# Patient Record
Sex: MALE | Race: WHITE | NOT HISPANIC OR LATINO | URBAN - METROPOLITAN AREA
[De-identification: names, ages, dates, MRNs, and addresses within clinical notes are randomized per-mention and may not be internally consistent; named-entity substitution may affect disease eponyms.]

---

## 2023-06-23 ENCOUNTER — TELEPHONE (OUTPATIENT)
Dept: FAMILY MEDICINE CLINIC | Facility: CLINIC | Age: 58
End: 2023-06-23

## 2023-06-23 NOTE — TELEPHONE ENCOUNTER
Caller: Jaime Goff    Relationship: Self    Best call back number: [phone]     What is the best time to reach you: ASAP    Who are you requesting to speak with (clinical staff, provider,  specific staff member): BOO HERNANDEZ    What was the call regarding: PATIENT IS REQUESTING TO SPEAK WITH BOO HERNANDEZ REGARDING THE PAIN MEDICATION HE WAS PRESCRIBED TODAY, 06- AS HE HAS QUESTIONS REGARDING THIS MEDICATION.    PLEASE CALL ASAP TO DISCUSS

## 2023-08-21 ENCOUNTER — APPOINTMENT (OUTPATIENT)
Dept: GENERAL RADIOLOGY | Facility: HOSPITAL | Age: 58
End: 2023-08-21
Payer: MEDICAID

## 2023-08-21 ENCOUNTER — HOSPITAL ENCOUNTER (OUTPATIENT)
Facility: HOSPITAL | Age: 58
Discharge: HOME | End: 2023-08-21
Attending: EMERGENCY MEDICINE | Admitting: EMERGENCY MEDICINE
Payer: MEDICAID

## 2023-08-21 VITALS
OXYGEN SATURATION: 99 % | SYSTOLIC BLOOD PRESSURE: 142 MMHG | HEART RATE: 114 BPM | RESPIRATION RATE: 18 BRPM | TEMPERATURE: 98.4 F | HEIGHT: 72 IN | DIASTOLIC BLOOD PRESSURE: 102 MMHG | WEIGHT: 180 LBS | BODY MASS INDEX: 24.38 KG/M2

## 2023-08-21 DIAGNOSIS — S43.401A SPRAIN OF RIGHT SHOULDER, UNSPECIFIED SHOULDER SPRAIN TYPE, INITIAL ENCOUNTER: Primary | ICD-10-CM

## 2023-08-21 DIAGNOSIS — L03.113 CELLULITIS OF RIGHT UPPER EXTREMITY: ICD-10-CM

## 2023-08-21 PROCEDURE — G0463 HOSPITAL OUTPT CLINIC VISIT: HCPCS | Performed by: EMERGENCY MEDICINE

## 2023-08-21 PROCEDURE — 71046 X-RAY EXAM CHEST 2 VIEWS: CPT

## 2023-08-21 PROCEDURE — 73090 X-RAY EXAM OF FOREARM: CPT

## 2023-08-21 PROCEDURE — 73060 X-RAY EXAM OF HUMERUS: CPT

## 2023-08-21 PROCEDURE — 73030 X-RAY EXAM OF SHOULDER: CPT

## 2023-08-21 PROCEDURE — 63710000001 ONDANSETRON ODT 4 MG TABLET DISPERSIBLE: Performed by: EMERGENCY MEDICINE

## 2023-08-21 RX ORDER — HYDROCODONE BITARTRATE AND ACETAMINOPHEN 5; 325 MG/1; MG/1
1 TABLET ORAL EVERY 6 HOURS PRN
Qty: 10 TABLET | Refills: 0 | Status: SHIPPED | OUTPATIENT
Start: 2023-08-21 | End: 2023-08-28 | Stop reason: HOSPADM

## 2023-08-21 RX ORDER — CLINDAMYCIN HYDROCHLORIDE 300 MG/1
300 CAPSULE ORAL 3 TIMES DAILY
Qty: 30 CAPSULE | Refills: 0 | Status: SHIPPED | OUTPATIENT
Start: 2023-08-21 | End: 2023-08-28 | Stop reason: HOSPADM

## 2023-08-21 RX ORDER — HYDROCODONE BITARTRATE AND ACETAMINOPHEN 7.5; 325 MG/1; MG/1
1 TABLET ORAL ONCE
Status: COMPLETED | OUTPATIENT
Start: 2023-08-21 | End: 2023-08-21

## 2023-08-21 RX ORDER — ONDANSETRON 4 MG/1
4 TABLET, ORALLY DISINTEGRATING ORAL ONCE
Status: COMPLETED | OUTPATIENT
Start: 2023-08-21 | End: 2023-08-21

## 2023-08-21 RX ADMIN — HYDROCODONE BITARTRATE AND ACETAMINOPHEN 1 TABLET: 7.5; 325 TABLET ORAL at 18:57

## 2023-08-21 RX ADMIN — ONDANSETRON 4 MG: 4 TABLET, ORALLY DISINTEGRATING ORAL at 18:57

## 2023-08-21 NOTE — Clinical Note
[facility] [place]  [address]  [place] [address]    Jaime Goff was seen and treated in our emergency department on 8/21/2023.  He may return to work on 08/28/2023.         Thank you for choosing [facility].    Rah Torres MD

## 2023-08-21 NOTE — FSED PROVIDER NOTE
Subjective   History of Present Illness  Patient is a 58-year-old male.  He presents after a fall 2 days ago on Saturday.  He states he been on several steps.  He did strike his right shoulder and right upper extremity.  He may have had brief loss of conscious.  He is not anticoagulated.  He denies significant headache no nausea vomiting no confusion.  No midline neck or back pain.  No abdominal pain.  He primarily has significant pain at the right shoulder.  There is soft tissue swelling noted as well with some blisters overlying the right lateral shoulder.  He is up-to-date on immunization which was within the last year or 2.  He is right-hand dominant.  He reports decreased range of motion at the shoulder.  No focal motor or sensory deficits other than decreased flexion and abduction at the shoulder secondary to pain      Review of Systems  Constitutional: No fevers, chills, sweats unless otherwise documented in HPI  Eyes: No recent visual problems, eye discharge, eye pain, redness unless otherwise documented in HPI  HEENT: No ear pain, nasal congestion, sore throat, voice changes unless otherwise documented in HPI  Respiratory: No shortness of breath, cough, pain on breathing, sputum production unless otherwise documented in HPI  Cardiovascular: No chest pain, palpitations, syncope, orthopnea unless otherwise documented in HPI  Gastrointestinal: No nausea, vomiting, diarrhea, constipation unless otherwise documented in HPI  Genitourinary: No hematuria, dysuria, incontinence unless otherwise documented in HPI  Endocrine: Negative for excessive thirst, excessive hunger, excessive urination, heat or cold intolerance unless otherwise documented in HPI  Musculoskeletal: No back pain, neck pain, joint pain, muscle pain, decreased range of motion unless otherwise documented in HPI  Integumentary: No rash, pruritus, abrasion, lesions unless otherwise documented in HPI  Neurologic: No weakness, numbness, frequent  headaches, tremors unless otherwise documented in HPI  Psychiatric: No anxiety, depression, mood changes, hallucinations unless otherwise documented in HPI        History reviewed. No pertinent past medical history.    Allergies   Allergen Reactions    Penicillins Anaphylaxis       History reviewed. No pertinent surgical history.    History reviewed. No pertinent family history.    Social History     Socioeconomic History    Marital status: Single   Tobacco Use    Smoking status: Never     Passive exposure: Never    Smokeless tobacco: Never   Vaping Use    Vaping Use: Never used   Substance and Sexual Activity    Alcohol use: Never    Drug use: Never    Sexual activity: Defer           Objective   Physical Exam  Vital signs: [Reviewed in nurses notes]    General: Awake alert.  He does appear to feel poorly but is nontoxic    HEENT: Normocephalic atraumatic pupils equal round sponsored to light nasopharynx clear pharynx clear and moist    Neck:   Supple.  No midline cervical spine tenderness no step-offs    Respiratory:   Clear to auscultation bilaterally with equal breath sounds.    Chest: There is some tenderness in the right superior chest wall.  No crepitus    Cardiovascular: Regular rate and rhythm no murmurs.    Abdomen: Soft nondistended nontender x4 quadrants    Back: No midline thoracic or lumbosacral tenderness    Musculoskeletal: There is significant tenderness to palpation on the right lateral shoulder.  There is also soft tissue swelling noted.  There are some overlying blisters at the lateral shoulder.  The elbow itself is nontender the wrist is nontender radial ulnar pulses 2+ and equal distally.  Radial ulnar median nerves are intact to motor and sensory testing at the elbow and wrist.  Patient does have decreased abduction and shoulder flexion      Skin:   There are some blisters overlying the right lateral shoulder.  No secondary cellulitis    Neurological examination: Awake alert oriented x4.  GCS  15.  Normal motor and sensory testing x4 extremities except for decreased range of motion of the right shoulder secondary to pain.    Procedures           ED Course  ED Course as of 08/21/23 2137   Mon Aug 21, 2023   2133 I assumed care of this patient from Dr. Torres.  Patient presents with right arm pain after a fall.  X-rays show possible age-indeterminate enthesophyte fracture.  Patient has no tenderness in this area.  Patient does have an area of skin breakdown to the right shoulder with some warmth and erythema there.  Will prescribe antibiotics for possible cellulitis.  Patient is neurovascularly intact.  Strong distal pulse and normal sensation.  Compartments are soft.  No evidence of compartment syndrome at this time.  Offered Doppler to rule out DVT but patient declined.  Also offered CT of the head to rule out intracranial bleed but patient also declined.  We will discharge patient in sling and have him follow-up with orthopedics. [DH]      ED Course User Index  [DH] Sterling Price MD                                         Differential diagnoses includes fracture dislocation strain  Medical Decision Making  Amount and/or Complexity of Data Reviewed  Radiology: ordered.    Risk  Prescription drug management.    The x-rays were independently reviewed as well as review of the radiologist interpretation    Final diagnoses:   Sprain of right shoulder, unspecified shoulder sprain type, initial encounter   Cellulitis of right upper extremity       ED Disposition  ED Disposition       ED Disposition   Discharge    Condition   Stable    Comment   --               Tami Peralta, APRN  [address]  [facility] [id number] [phone]          James Nagel II, MD  [address]  [facility] [address]  [phone]    Schedule an appointment as soon as possible for a visit   Orthopedics         Medication List        New Prescriptions      clindamycin 300 MG capsule  Commonly known as:  CLEOCIN  Take 1 capsule by mouth 3 (Three) Times a Day for 10 days.     HYDROcodone-acetaminophen 5-325 MG per tablet  Commonly known as: NORCO  Take 1 tablet by mouth Every 6 (Six) Hours As Needed for Severe Pain.               Where to Get Your Medications        These medications were sent to [facility]/pharmacy #[id number] - [place], [place] - [address] [place] - [phone] [facility] [phone]   [address], [place] [address]      Hours: 24-hours Phone: [phone]   clindamycin 300 MG capsule  HYDROcodone-acetaminophen 5-325 MG per tablet

## 2023-08-22 NOTE — DISCHARGE INSTRUCTIONS
Keep the sling in place for comfort.  Use the antibiotics to treat the infection.  Use the Norco (hydrocodone) as needed for breakthrough pain but do not drive, operate heavy machinery, or perform other dangerous activities while using it.  Follow-up with orthopedics as soon as possible.

## 2023-08-26 ENCOUNTER — APPOINTMENT (OUTPATIENT)
Dept: ULTRASOUND IMAGING | Facility: HOSPITAL | Age: 58
DRG: 556 | End: 2023-08-26
Payer: MEDICAID

## 2023-08-26 ENCOUNTER — HOSPITAL ENCOUNTER (INPATIENT)
Facility: HOSPITAL | Age: 58
LOS: 1 days | Discharge: HOME | DRG: 556 | End: 2023-08-28
Attending: EMERGENCY MEDICINE | Admitting: HOSPITALIST
Payer: MEDICAID

## 2023-08-26 ENCOUNTER — APPOINTMENT (OUTPATIENT)
Dept: CT IMAGING | Facility: HOSPITAL | Age: 58
DRG: 556 | End: 2023-08-26
Payer: MEDICAID

## 2023-08-26 DIAGNOSIS — M60.9: Primary | ICD-10-CM

## 2023-08-26 LAB
ALBUMIN SERPL-MCNC: 3.4 G/DL (ref 3.5–5.2)
ALBUMIN/GLOB SERPL: 1.5 G/DL
ALP SERPL-CCNC: 57 U/L (ref 39–117)
ALT SERPL W P-5'-P-CCNC: 91 U/L (ref 1–41)
ANION GAP SERPL CALCULATED.3IONS-SCNC: 1.9 MMOL/L (ref 5–15)
AST SERPL-CCNC: 76 U/L (ref 1–40)
BASOPHILS # BLD AUTO: 0.04 10*3/MM3 (ref 0–0.2)
BASOPHILS NFR BLD AUTO: 0.5 % (ref 0–1.5)
BILIRUB SERPL-MCNC: 0.3 MG/DL (ref 0–1.2)
BUN SERPL-MCNC: 21 MG/DL (ref 6–20)
BUN/CREAT SERPL: 17.1 (ref 7–25)
CALCIUM SPEC-SCNC: 8.5 MG/DL (ref 8.6–10.5)
CHLORIDE SERPL-SCNC: 100 MMOL/L (ref 98–107)
CO2 SERPL-SCNC: 27.1 MMOL/L (ref 22–29)
CREAT SERPL-MCNC: 1.23 MG/DL (ref 0.76–1.27)
D-LACTATE SERPL-SCNC: 0.7 MMOL/L (ref 0.5–2)
DEPRECATED RDW RBC AUTO: 42.1 FL (ref 37–54)
EGFRCR SERPLBLD CKD-EPI 2021: 68.9 ML/MIN/1.73
EOSINOPHIL # BLD AUTO: 0.33 10*3/MM3 (ref 0–0.4)
EOSINOPHIL NFR BLD AUTO: 3.8 % (ref 0.3–6.2)
ERYTHROCYTE [DISTWIDTH] IN BLOOD BY AUTOMATED COUNT: 12.1 % (ref 12.3–15.4)
GLOBULIN UR ELPH-MCNC: 2.2 GM/DL
GLUCOSE SERPL-MCNC: 88 MG/DL (ref 65–99)
HCT VFR BLD AUTO: 37.2 % (ref 37.5–51)
HGB BLD-MCNC: 12.7 G/DL (ref 13–17.7)
IMM GRANULOCYTES # BLD AUTO: 0.1 10*3/MM3 (ref 0–0.05)
IMM GRANULOCYTES NFR BLD AUTO: 1.1 % (ref 0–0.5)
INR PPP: 0.9
LYMPHOCYTES # BLD AUTO: 1.63 10*3/MM3 (ref 0.7–3.1)
LYMPHOCYTES NFR BLD AUTO: 18.6 % (ref 19.6–45.3)
MCH RBC QN AUTO: 31.7 PG (ref 26.6–33)
MCHC RBC AUTO-ENTMCNC: 34.1 G/DL (ref 31.5–35.7)
MCV RBC AUTO: 92.8 FL (ref 79–97)
MONOCYTES # BLD AUTO: 0.78 10*3/MM3 (ref 0.1–0.9)
MONOCYTES NFR BLD AUTO: 8.9 % (ref 5–12)
NEUTROPHILS NFR BLD AUTO: 5.89 10*3/MM3 (ref 1.7–7)
NEUTROPHILS NFR BLD AUTO: 67.1 % (ref 42.7–76)
PLATELET # BLD AUTO: 225 10*3/MM3 (ref 140–450)
PMV BLD AUTO: 9.3 FL (ref 6–12)
POTASSIUM SERPL-SCNC: 4.2 MMOL/L (ref 3.5–5.2)
PROT SERPL-MCNC: 5.6 G/DL (ref 6–8.5)
PROTHROMBIN TIME: 10.9 SECONDS (ref 11–15)
RBC # BLD AUTO: 4.01 10*6/MM3 (ref 4.14–5.8)
SODIUM SERPL-SCNC: 129 MMOL/L (ref 136–145)
WBC NRBC COR # BLD: 8.77 10*3/MM3 (ref 3.4–10.8)

## 2023-08-26 PROCEDURE — 99285 EMERGENCY DEPT VISIT HI MDM: CPT

## 2023-08-26 PROCEDURE — 25510000001 IOPAMIDOL PER 1 ML: Performed by: EMERGENCY MEDICINE

## 2023-08-26 PROCEDURE — 36415 COLL VENOUS BLD VENIPUNCTURE: CPT

## 2023-08-26 PROCEDURE — 85025 COMPLETE CBC W/AUTO DIFF WBC: CPT | Performed by: PHYSICIAN ASSISTANT

## 2023-08-26 PROCEDURE — 80053 COMPREHEN METABOLIC PANEL: CPT | Performed by: PHYSICIAN ASSISTANT

## 2023-08-26 PROCEDURE — 86140 C-REACTIVE PROTEIN: CPT | Performed by: HOSPITALIST

## 2023-08-26 PROCEDURE — 83605 ASSAY OF LACTIC ACID: CPT | Performed by: EMERGENCY MEDICINE

## 2023-08-26 PROCEDURE — 87040 BLOOD CULTURE FOR BACTERIA: CPT | Performed by: EMERGENCY MEDICINE

## 2023-08-26 PROCEDURE — 84145 PROCALCITONIN (PCT): CPT | Performed by: HOSPITALIST

## 2023-08-26 PROCEDURE — 73201 CT UPPER EXTREMITY W/DYE: CPT

## 2023-08-26 PROCEDURE — 93971 EXTREMITY STUDY: CPT

## 2023-08-26 PROCEDURE — 83874 ASSAY OF MYOGLOBIN: CPT | Performed by: EMERGENCY MEDICINE

## 2023-08-26 PROCEDURE — 82550 ASSAY OF CK (CPK): CPT | Performed by: EMERGENCY MEDICINE

## 2023-08-26 PROCEDURE — 85610 PROTHROMBIN TIME: CPT

## 2023-08-26 RX ORDER — MORPHINE SULFATE 4 MG/ML
4 INJECTION, SOLUTION INTRAMUSCULAR; INTRAVENOUS ONCE
Status: COMPLETED | OUTPATIENT
Start: 2023-08-27 | End: 2023-08-27

## 2023-08-26 RX ORDER — CLINDAMYCIN PHOSPHATE 900 MG/50ML
900 INJECTION, SOLUTION INTRAVENOUS ONCE
Status: COMPLETED | OUTPATIENT
Start: 2023-08-27 | End: 2023-08-27

## 2023-08-26 RX ORDER — LEVOFLOXACIN 5 MG/ML
750 INJECTION, SOLUTION INTRAVENOUS ONCE
Status: COMPLETED | OUTPATIENT
Start: 2023-08-27 | End: 2023-08-27

## 2023-08-26 RX ADMIN — IOPAMIDOL 85 ML: 755 INJECTION, SOLUTION INTRAVENOUS at 21:22

## 2023-08-27 ENCOUNTER — APPOINTMENT (OUTPATIENT)
Dept: MRI IMAGING | Facility: HOSPITAL | Age: 58
DRG: 556 | End: 2023-08-27
Payer: MEDICAID

## 2023-08-27 PROBLEM — M60.9: Status: ACTIVE | Noted: 2023-08-27

## 2023-08-27 LAB
AMPHET+METHAMPHET UR QL: NEGATIVE
AMPHETAMINES UR QL: NEGATIVE
BARBITURATES UR QL SCN: POSITIVE
BENZODIAZ UR QL SCN: NEGATIVE
BILIRUB UR QL STRIP: NEGATIVE
BUPRENORPHINE SERPL-MCNC: NEGATIVE NG/ML
CANNABINOIDS SERPL QL: POSITIVE
CK SERPL-CCNC: 1177 U/L (ref 20–200)
CK SERPL-CCNC: 644 U/L (ref 20–200)
CLARITY UR: CLEAR
COCAINE UR QL: NEGATIVE
COLOR UR: YELLOW
CRP SERPL-MCNC: 0.39 MG/DL (ref 0–0.5)
GLUCOSE UR STRIP-MCNC: NEGATIVE MG/DL
HGB UR QL STRIP.AUTO: ABNORMAL
KETONES UR QL STRIP: NEGATIVE
LEUKOCYTE ESTERASE UR QL STRIP.AUTO: NEGATIVE
METHADONE UR QL SCN: NEGATIVE
MYOGLOBIN SERPL-MCNC: 39.2 NG/ML (ref 28–72)
NITRITE UR QL STRIP: NEGATIVE
OPIATES UR QL: NEGATIVE
OXYCODONE UR QL SCN: NEGATIVE
PCP UR QL SCN: NEGATIVE
PH UR STRIP.AUTO: 6 [PH] (ref 5–8)
PROCALCITONIN SERPL-MCNC: 0.11 NG/ML (ref 0–0.25)
PROPOXYPH UR QL: NEGATIVE
PROT UR QL STRIP: NEGATIVE
SP GR UR STRIP: 1.01 (ref 1–1.03)
TRICYCLICS UR QL SCN: NEGATIVE
UROBILINOGEN UR QL STRIP: ABNORMAL

## 2023-08-27 PROCEDURE — 99222 1ST HOSP IP/OBS MODERATE 55: CPT | Performed by: INTERNAL MEDICINE

## 2023-08-27 PROCEDURE — 81003 URINALYSIS AUTO W/O SCOPE: CPT | Performed by: EMERGENCY MEDICINE

## 2023-08-27 PROCEDURE — 25010000002 CLINDAMYCIN PER 300 MG: Performed by: EMERGENCY MEDICINE

## 2023-08-27 PROCEDURE — 25010000002 CLINDAMYCIN 900 MG/6ML SOLUTION: Performed by: NURSE PRACTITIONER

## 2023-08-27 PROCEDURE — 25010000002 VANCOMYCIN 750 MG RECONSTITUTED SOLUTION 1 EACH VIAL: Performed by: NURSE PRACTITIONER

## 2023-08-27 PROCEDURE — 25010000002 LEVOFLOXACIN PER 250 MG: Performed by: EMERGENCY MEDICINE

## 2023-08-27 PROCEDURE — 80306 DRUG TEST PRSMV INSTRMNT: CPT | Performed by: EMERGENCY MEDICINE

## 2023-08-27 PROCEDURE — 82550 ASSAY OF CK (CPK): CPT | Performed by: HOSPITALIST

## 2023-08-27 PROCEDURE — 25010000002 MORPHINE PER 10 MG: Performed by: NURSE PRACTITIONER

## 2023-08-27 PROCEDURE — 73223 MRI JOINT UPR EXTR W/O&W/DYE: CPT

## 2023-08-27 PROCEDURE — 0 GADOBENATE DIMEGLUMINE 529 MG/ML SOLUTION: Performed by: ORTHOPAEDIC SURGERY

## 2023-08-27 PROCEDURE — 25010000002 VANCOMYCIN 10 G RECONSTITUTED SOLUTION: Performed by: EMERGENCY MEDICINE

## 2023-08-27 PROCEDURE — 25010000002 MORPHINE PER 10 MG: Performed by: EMERGENCY MEDICINE

## 2023-08-27 PROCEDURE — A9577 INJ MULTIHANCE: HCPCS | Performed by: ORTHOPAEDIC SURGERY

## 2023-08-27 RX ORDER — CLINDAMYCIN PHOSPHATE 600 MG/50ML
600 INJECTION, SOLUTION INTRAVENOUS EVERY 8 HOURS
Status: DISCONTINUED | OUTPATIENT
Start: 2023-08-27 | End: 2023-08-27

## 2023-08-27 RX ORDER — SODIUM CHLORIDE 0.9 % (FLUSH) 0.9 %
10 SYRINGE (ML) INJECTION EVERY 12 HOURS SCHEDULED
Status: DISCONTINUED | OUTPATIENT
Start: 2023-08-27 | End: 2023-08-28 | Stop reason: HOSPADM

## 2023-08-27 RX ORDER — ACETAMINOPHEN 160 MG/5ML
650 SOLUTION ORAL EVERY 4 HOURS PRN
Status: DISCONTINUED | OUTPATIENT
Start: 2023-08-27 | End: 2023-08-28 | Stop reason: HOSPADM

## 2023-08-27 RX ORDER — ACETAMINOPHEN 325 MG/1
650 TABLET ORAL EVERY 4 HOURS PRN
Status: DISCONTINUED | OUTPATIENT
Start: 2023-08-27 | End: 2023-08-28 | Stop reason: HOSPADM

## 2023-08-27 RX ORDER — ONDANSETRON 4 MG/1
4 TABLET, FILM COATED ORAL EVERY 6 HOURS PRN
Status: DISCONTINUED | OUTPATIENT
Start: 2023-08-27 | End: 2023-08-28 | Stop reason: HOSPADM

## 2023-08-27 RX ORDER — POLYETHYLENE GLYCOL 3350 17 G/17G
17 POWDER, FOR SOLUTION ORAL DAILY PRN
Status: DISCONTINUED | OUTPATIENT
Start: 2023-08-27 | End: 2023-08-28 | Stop reason: HOSPADM

## 2023-08-27 RX ORDER — BISACODYL 10 MG
10 SUPPOSITORY, RECTAL RECTAL DAILY PRN
Status: DISCONTINUED | OUTPATIENT
Start: 2023-08-27 | End: 2023-08-28 | Stop reason: HOSPADM

## 2023-08-27 RX ORDER — SODIUM CHLORIDE 9 MG/ML
40 INJECTION, SOLUTION INTRAVENOUS AS NEEDED
Status: DISCONTINUED | OUTPATIENT
Start: 2023-08-27 | End: 2023-08-28 | Stop reason: HOSPADM

## 2023-08-27 RX ORDER — AMOXICILLIN 250 MG
2 CAPSULE ORAL 2 TIMES DAILY
Status: DISCONTINUED | OUTPATIENT
Start: 2023-08-27 | End: 2023-08-28 | Stop reason: HOSPADM

## 2023-08-27 RX ORDER — ONDANSETRON 2 MG/ML
4 INJECTION INTRAMUSCULAR; INTRAVENOUS EVERY 6 HOURS PRN
Status: DISCONTINUED | OUTPATIENT
Start: 2023-08-27 | End: 2023-08-28 | Stop reason: HOSPADM

## 2023-08-27 RX ORDER — MORPHINE SULFATE 2 MG/ML
1 INJECTION, SOLUTION INTRAMUSCULAR; INTRAVENOUS EVERY 4 HOURS PRN
Status: DISCONTINUED | OUTPATIENT
Start: 2023-08-27 | End: 2023-08-28 | Stop reason: HOSPADM

## 2023-08-27 RX ORDER — HYDROCODONE BITARTRATE AND ACETAMINOPHEN 7.5; 325 MG/1; MG/1
1 TABLET ORAL EVERY 4 HOURS PRN
Status: DISCONTINUED | OUTPATIENT
Start: 2023-08-27 | End: 2023-08-28 | Stop reason: HOSPADM

## 2023-08-27 RX ORDER — SODIUM CHLORIDE 0.9 % (FLUSH) 0.9 %
10 SYRINGE (ML) INJECTION AS NEEDED
Status: DISCONTINUED | OUTPATIENT
Start: 2023-08-27 | End: 2023-08-28 | Stop reason: HOSPADM

## 2023-08-27 RX ORDER — CALCIUM CARBONATE 500 MG/1
2 TABLET, CHEWABLE ORAL 2 TIMES DAILY PRN
Status: DISCONTINUED | OUTPATIENT
Start: 2023-08-27 | End: 2023-08-28 | Stop reason: HOSPADM

## 2023-08-27 RX ORDER — MORPHINE SULFATE 4 MG/ML
4 INJECTION, SOLUTION INTRAMUSCULAR; INTRAVENOUS ONCE
Status: COMPLETED | OUTPATIENT
Start: 2023-08-27 | End: 2023-08-27

## 2023-08-27 RX ORDER — ACETAMINOPHEN 650 MG/1
650 SUPPOSITORY RECTAL EVERY 4 HOURS PRN
Status: DISCONTINUED | OUTPATIENT
Start: 2023-08-27 | End: 2023-08-28 | Stop reason: HOSPADM

## 2023-08-27 RX ORDER — SODIUM CHLORIDE 9 MG/ML
75 INJECTION, SOLUTION INTRAVENOUS CONTINUOUS
Status: DISCONTINUED | OUTPATIENT
Start: 2023-08-27 | End: 2023-08-28 | Stop reason: HOSPADM

## 2023-08-27 RX ORDER — BISACODYL 5 MG/1
5 TABLET, DELAYED RELEASE ORAL DAILY PRN
Status: DISCONTINUED | OUTPATIENT
Start: 2023-08-27 | End: 2023-08-28 | Stop reason: HOSPADM

## 2023-08-27 RX ADMIN — VANCOMYCIN HYDROCHLORIDE 1750 MG: 10 INJECTION, POWDER, LYOPHILIZED, FOR SOLUTION INTRAVENOUS at 01:24

## 2023-08-27 RX ADMIN — SODIUM CHLORIDE 900 MG: 9 INJECTION, SOLUTION INTRAVENOUS at 00:05

## 2023-08-27 RX ADMIN — VANCOMYCIN HYDROCHLORIDE 750 MG: 750 INJECTION, POWDER, LYOPHILIZED, FOR SOLUTION INTRAVENOUS at 16:04

## 2023-08-27 RX ADMIN — HYDROCODONE BITARTRATE AND ACETAMINOPHEN 1 TABLET: 7.5; 325 TABLET ORAL at 09:06

## 2023-08-27 RX ADMIN — MORPHINE SULFATE 4 MG: 4 INJECTION, SOLUTION INTRAMUSCULAR; INTRAVENOUS at 00:04

## 2023-08-27 RX ADMIN — Medication 10 ML: at 09:06

## 2023-08-27 RX ADMIN — SENNOSIDES AND DOCUSATE SODIUM 2 TABLET: 50; 8.6 TABLET ORAL at 20:25

## 2023-08-27 RX ADMIN — SODIUM CHLORIDE 75 ML/HR: 9 INJECTION, SOLUTION INTRAVENOUS at 23:24

## 2023-08-27 RX ADMIN — LEVOFLOXACIN 750 MG: 5 INJECTION, SOLUTION INTRAVENOUS at 03:39

## 2023-08-27 RX ADMIN — MORPHINE SULFATE 1 MG: 2 INJECTION, SOLUTION INTRAMUSCULAR; INTRAVENOUS at 06:37

## 2023-08-27 RX ADMIN — MORPHINE SULFATE 4 MG: 4 INJECTION, SOLUTION INTRAMUSCULAR; INTRAVENOUS at 01:53

## 2023-08-27 RX ADMIN — GADOBENATE DIMEGLUMINE 16 ML: 529 INJECTION, SOLUTION INTRAVENOUS at 14:22

## 2023-08-27 RX ADMIN — SODIUM CHLORIDE 75 ML/HR: 9 INJECTION, SOLUTION INTRAVENOUS at 06:38

## 2023-08-27 RX ADMIN — MORPHINE SULFATE 1 MG: 2 INJECTION, SOLUTION INTRAMUSCULAR; INTRAVENOUS at 20:25

## 2023-08-27 RX ADMIN — SODIUM CHLORIDE 600 MG: 9 INJECTION, SOLUTION INTRAVENOUS at 09:06

## 2023-08-27 RX ADMIN — MORPHINE SULFATE 1 MG: 2 INJECTION, SOLUTION INTRAMUSCULAR; INTRAVENOUS at 11:57

## 2023-08-27 RX ADMIN — MORPHINE SULFATE 1 MG: 2 INJECTION, SOLUTION INTRAMUSCULAR; INTRAVENOUS at 16:06

## 2023-08-27 NOTE — CONSULTS
"Referring Provider: Dr Morales    Reason for Consultation: \"right arm swelling\"    History of present illness:  Jaime Goff is a 58 y.o. who I am asked to evaluate and give opinion for \"right arm swelling\". History is obtained from the patient and review of the old medical records which I summarize/synthesize as follows: About a week ago he had a fall down the stairs at his home and landed on the R shoulder. He experienced sharp pain and swelling. He had a skin abrasion. He was seen in the ER here and had x-rays negative for fracture. Due to concern for superimposed cellulitis, he was RXed clindamycin.     He returned to the ER yesterday with persistent pain and ongoing swelling. No fever or chills. He was found to have a normal WBC and fairly low CRP of 0.39 mg/dL. His CK was quite elevated at 1,177. He had a CT done (see below). ID and orthopedics consulted. He was started on IV vancomycin and clindamycin. He was also give a 1x dose of levofloxacin.     He denies a prior history of any serious or unusual infections. No history of MRSA.     PMH/PSH:  Denies any current medical problems    Social History:  Lives in the [place]  Has a girlfriend    Antibiotic allergies and intolerances:    Penicillin - \"whole body swelling\"    Medications:    Current Facility-Administered Medications:     acetaminophen (TYLENOL) tablet 650 mg, 650 mg, Oral, Q4H PRN **OR** acetaminophen (TYLENOL) 160 MG/5ML solution 650 mg, 650 mg, Oral, Q4H PRN **OR** acetaminophen (TYLENOL) suppository 650 mg, 650 mg, Rectal, Q4H PRN, Edwige Hunter APRN    sennosides-docusate (PERICOLACE) 8.6-50 MG per tablet 2 tablet, 2 tablet, Oral, BID **AND** polyethylene glycol (MIRALAX) packet 17 g, 17 g, Oral, Daily PRN **AND** bisacodyl (DULCOLAX) EC tablet 5 mg, 5 mg, Oral, Daily PRN **AND** bisacodyl (DULCOLAX) suppository 10 mg, 10 mg, Rectal, Daily PRN, Edwige Hunter APRN    calcium carbonate (TUMS) chewable tablet 500 mg (200 mg " elemental), 2 tablet, Oral, BID PRN, Edwige Hunter APRN    clindamycin (CLEOCIN) 600 mg in sodium chloride 0.9% 50 mL IVPB (premix), 600 mg, Intravenous, Q8H, Edwige Hunter APRN, Last Rate: 50 mL/hr at 08/27/23 0906, 600 mg at 08/27/23 0906    HYDROcodone-acetaminophen (NORCO) 7.5-325 MG per tablet 1 tablet, 1 tablet, Oral, Q4H PRN, Edwige Hunter APRN, 1 tablet at 08/27/23 0906    morphine injection 1 mg, 1 mg, Intravenous, Q4H PRN, Edwige Hunter APRN, 1 mg at 08/27/23 0637    ondansetron (ZOFRAN) tablet 4 mg, 4 mg, Oral, Q6H PRN **OR** ondansetron (ZOFRAN) injection 4 mg, 4 mg, Intravenous, Q6H PRN, Edwige Hunter APRN    Pharmacy Consult - Pharmacy to dose, , Does not apply, Continuous PRN, Edwige Hunter APRN    Pharmacy to dose vancomycin, , Does not apply, Continuous PRN, Edwige Hunter APRN    sodium chloride 0.9 % flush 10 mL, 10 mL, Intravenous, Q12H, Edwige Hunter APRN, 10 mL at 08/27/23 0906    sodium chloride 0.9 % flush 10 mL, 10 mL, Intravenous, PRN, Edwige Hunter APRN    sodium chloride 0.9 % infusion 40 mL, 40 mL, Intravenous, PRN, Edwige Hunter APRN    sodium chloride 0.9 % infusion, 75 mL/hr, Intravenous, Continuous, Edwige Hunter APRN, Last Rate: 75 mL/hr at 08/27/23 0638, 75 mL/hr at 08/27/23 0638      Objective   Vital Signs   Temp:  [97 °F (36.1 °C)-98.4 °F (36.9 °C)] 97 °F (36.1 °C)  Heart Rate:  [66-88] 66  Resp:  [14-16] 16  BP: (135-144)/(78-90) 135/79    Physical Exam:   General: awake, alert, NAD   Eyes: no scleral icterus  ENT: no thrush  Cardiovascular: NR  Respiratory: normal work of breathing on ambient air  GI: Abdomen is soft, not tender, + bowel sounds in all four quadrants  :  no Hi catheter  MSK: R shoulder abrasion; mildly warm but no erythema or drainage; R arm edematous; good movement of fingers  Skin: No rashes  Neurological: Alert and oriented x 3  Psychiatric: Normal mood and affect  "  Vasc: PIV w/o erythema; R radial pulse is strong    Labs:     Lab Results   Component Value Date    WBC 8.77 08/26/2023    HGB 12.7 (L) 08/26/2023    HCT 37.2 (L) 08/26/2023    MCV 92.8 08/26/2023     08/26/2023       Lab Results   Component Value Date    GLUCOSE 88 08/26/2023    BUN 21 (H) 08/26/2023    CREATININE 1.23 08/26/2023    BCR 17.1 08/26/2023    CO2 27.1 08/26/2023    CALCIUM 8.5 (L) 08/26/2023    ALBUMIN 3.4 (L) 08/26/2023    AST 76 (H) 08/26/2023    ALT 91 (H) 08/26/2023     Lab Results   Component Value Date    CRP 0.39 08/26/2023     Lab Results   Component Value Date    CKTOTAL 644 (H) 08/27/2023     Procal 0.11  LA 0.7  UDS + THC and barbiturates    Microbiology:  8/26 BCx: pending    Radiology:  CT RUE:   \"1.  Multifocal areas of geographic absent intramuscular enhancement   around the shoulder most pronounced within the deltoid but also involving   the proximal triceps, latissimus, and pectoralis major.  Muscular infarct,    myositis, and posttraumatic changes are all on the differential.    Consider MRI of the shoulder with and without contrast.   2.  Extensive subcutaneous soft tissue edema throughout the right upper   extremity.  No visualized abscess or soft tissue gas.     ASSESSMENT/PLAN:  Right arm pain and swelling  Elevated CPK  S/P fall down stairs at home    I think his swelling and pain are more likely due injury from the fall rather than infection. He his afebrile with a normal WBC and low CRP. He has an abrasion but it does not appear infected. Orthopedics evaluation is pending. I will follow-up their note. I'll keep him on the vancomycin while awaiting blood cultures, clinical course, and orthopedics evaluation but if no definitive evidence of infection by tomorrow then I will plan to stop it. Stop clindamycin. He was on it at home w/o any appreciable change.     While the patient is on vancomycin, vancomycin levels will be ordered and reviewed with dose adjustments made as " needed. The patient will have a daily creatinine level checked while on vancomycin as part of monitoring this medication.     ID will follow. Will d/w Dr Morales.

## 2023-08-27 NOTE — ED NOTES
"Nursing report ED to floor  Jaime Goff  58 y.o.  male    HPI :   Chief Complaint   Patient presents with    Arm Swelling     right       Admitting doctor:   Antonio Horta MD    Admitting diagnosis:   The encounter diagnosis was Myositis of right upper extremity, unspecified myositis type.    Code status:   Current Code Status       Date Active Code Status Order ID Comments User Context       Not on file            Allergies:   Penicillins    Isolation:   No active isolations    Intake and Output    Intake/Output Summary (Last 24 hours) at 8/27/2023 0442  Last data filed at 8/27/2023 0333  Gross per 24 hour   Intake 500 ml   Output --   Net 500 ml       Weight:       08/26/23  1809   Weight: 81.6 kg (180 lb)       Most recent vitals:   Vitals:    08/26/23 1809 08/26/23 2100 08/27/23 0044 08/27/23 0429   BP: 135/90 144/82 137/85 143/78   BP Location:  Left arm  Left arm   Patient Position: Sitting Lying  Lying   Pulse: 81 66 66 88   Resp: 16 14 16 14   Temp: 98.3 °F (36.8 °C)  98 °F (36.7 °C) 98.4 °F (36.9 °C)   TempSrc:   Oral Oral   SpO2: 98% 100% 100% 98%   Weight: 81.6 kg (180 lb)      Height: 182.9 cm (72\")          Active LDAs/IV Access:   Lines, Drains & Airways       Active LDAs       Name Placement date Placement time Site Days    Peripheral IV 08/26/23 1856 Left Antecubital 08/26/23 1856  Antecubital  less than 1                    Labs (abnormal labs have a star):   Labs Reviewed   COMPREHENSIVE METABOLIC PANEL - Abnormal; Notable for the following components:       Result Value    BUN 21 (*)     Sodium 129 (*)     Calcium 8.5 (*)     Total Protein 5.6 (*)     Albumin 3.4 (*)     ALT (SGPT) 91 (*)     AST (SGOT) 76 (*)     Anion Gap 1.9 (*)     All other components within normal limits    Narrative:     GFR Normal >60  Chronic Kidney Disease <60  Kidney Failure <15     CBC WITH AUTO DIFFERENTIAL - Abnormal; Notable for the following components:    RBC 4.01 (*)     Hemoglobin 12.7 (*)     Hematocrit " 37.2 (*)     RDW 12.1 (*)     Lymphocyte % 18.6 (*)     Immature Grans % 1.1 (*)     Immature Grans, Absolute 0.10 (*)     All other components within normal limits   LAB PROTIME-INR, FINGERSTICK - Abnormal; Notable for the following components:    Protime 10.9 (*)     All other components within normal limits   CK - Abnormal; Notable for the following components:    Creatine Kinase 1,177 (*)     All other components within normal limits   URINE DRUG SCREEN - Abnormal; Notable for the following components:    THC, Screen, Urine Positive (*)     Barbiturates Screen, Urine Positive (*)     All other components within normal limits    Narrative:     Cutoff For Drugs Screened:    Amphetamines               500 ng/ml  Barbiturates               200 ng/ml  Benzodiazepines            150 ng/ml  Cocaine                    150 ng/ml  Methadone                  200 ng/ml  Opiates                    100 ng/ml  Phencyclidine               25 ng/ml  THC                            50 ng/ml  Methamphetamine            500 ng/ml  Tricyclic Antidepressants  300 ng/ml  Oxycodone                  100 ng/ml  Propoxyphene               300 ng/ml  Buprenorphine               10 ng/ml    The normal value for all drugs tested is negative. This report includes unconfirmed screening results, with the cutoff values listed, to be used for medical treatment purposes only.  Unconfirmed results must not be used for non-medical purposes such as employment or legal testing.  Clinical consideration should be applied to any drug of abuse test, particularly when unconfirmed results are used.     URINALYSIS W/ MICROSCOPIC IF INDICATED (NO CULTURE) - Abnormal; Notable for the following components:    Blood, UA Trace (*)     All other components within normal limits   LACTIC ACID, PLASMA - Normal   BLOOD CULTURE   BLOOD CULTURE   MYOGLOBIN, SERUM   URINALYSIS, MICROSCOPIC ONLY   POCT PROTIME - INR   CBC AND DIFFERENTIAL    Narrative:     The following  orders were created for panel order CBC & Differential.  Procedure                               Abnormality         Status                     ---------                               -----------         ------                     CBC Auto Differential[[id number]]        Abnormal            Final result                 Please view results for these tests on the individual orders.       EKG:   No orders to display       Meds given in ED:   Medications   levoFLOXacin (LEVAQUIN) 750 mg/150 mL D5W (premix) (LEVAQUIN) 750 mg (750 mg Intravenous New Bag 8/27/23 0339)   iopamidol (ISOVUE-370) 76 % injection 100 mL (85 mL Intravenous Given 8/26/23 2122)   Morphine sulfate (PF) injection 4 mg (4 mg Intravenous Given 8/27/23 0004)   vancomycin 1750 mg/500 mL 0.9% NS IVPB ([facility]) (0 mg Intravenous Stopped 8/27/23 0333)   clindamycin (CLEOCIN) 900 mg in sodium chloride 0.9% 50 mL IVPB (premix) (0 mg Intravenous Stopped 8/27/23 0115)   Morphine sulfate (PF) injection 4 mg (4 mg Intravenous Given 8/27/23 0153)       Imaging results:  US Venous Doppler Upper Extremity Right (duplex)    Result Date: 8/26/2023  Electronically signed by Baldomero Rodriguez MD on 08-26-23 at 2212    CT Upper Extremity Right With Contrast    Result Date: 8/26/2023  Electronically signed by Baldomero Rodriguez MD on 08-26-23 at 2307     Ambulatory status:   -ambulatory with no assistance    Social issues:   Social History     Socioeconomic History    Marital status: Single   Tobacco Use    Smoking status: Never     Passive exposure: Never    Smokeless tobacco: Never   Vaping Use    Vaping Use: Never used   Substance and Sexual Activity    Alcohol use: Never    Drug use: Never    Sexual activity: Defer       NIH Stroke Scale:       Viki Llanes RN  08/27/23 04:42 EDT

## 2023-08-27 NOTE — H&P
Patient Name:  Jaime Goff  YOB: 1965  MRN:  [id number]  Admit Date:  8/26/2023  Patient Care Team:  Tami Peralta APRN as PCP - General (Family Medicine)      Subjective   History Present Illness     Chief Complaint   Patient presents with    Arm Swelling     right       Mr. Goff is a 58 y.o. male with a history of no known medical history that presents to [facility] complaining of swelling and pain of the right arm.  He had a fall 8 days ago 8 days ago he had a fall and hit his right shoulder on a wall.  He sustained an abrasion to the right shoulder.  He began to have swelling and discomfort and sought medical care on 8/21, he was started on clindamycin at that time for cellulitis.  His swelling and pain became progressively worse and he sought care in the ED.  He denies any fevers, chills, sweats, nausea or any symptoms of being unwell.  He did have a couple of episodes of diarrhea.  He noticed some clear drainage from the right shoulder abrasion.    His entire right arm and hand are swollen and tender to touch.  He denies any numbness, tingling, loss of sensation or impaired range of motion.    He denies any current or history of substance use.  Denies any history of IV drug use.  He does not drink alcohol.  He smokes a half a pack of cigarettes per day.  He declines a nicotine patch.    Takes no daily medications or supplements.  He denies any medical history.  He denies any previous similar episodes of infection or any known MDRO's.      Review of Systems   Constitutional:  Positive for appetite change. Negative for activity change, chills, diaphoresis, fatigue and fever.   HENT:  Negative for congestion and rhinorrhea.    Eyes:  Negative for visual disturbance.   Respiratory:  Negative for cough and shortness of breath.    Cardiovascular:  Negative for chest pain, palpitations and leg swelling.   Gastrointestinal:  Positive for diarrhea. Negative for abdominal pain,  constipation, nausea and vomiting.   Endocrine: Negative for cold intolerance and heat intolerance.   Genitourinary:  Negative for difficulty urinating and dysuria.   Musculoskeletal:  Negative for arthralgias.   Skin:  Negative for rash.   Allergic/Immunologic: Negative for immunocompromised state.   Neurological:  Negative for dizziness, weakness, light-headedness and numbness.      Personal History     History reviewed. No pertinent past medical history.  History reviewed. No pertinent surgical history.  History reviewed. No pertinent family history.  Social History     Tobacco Use    Smoking status: Never     Passive exposure: Never    Smokeless tobacco: Never   Vaping Use    Vaping Use: Never used   Substance Use Topics    Alcohol use: Never    Drug use: Never     No current facility-administered medications on file prior to encounter.     Current Outpatient Medications on File Prior to Encounter   Medication Sig Dispense Refill    albuterol sulfate  (90 Base) MCG/ACT inhaler Inhale 2 puffs Every 4 (Four) Hours As Needed for Wheezing. 100 g 0    clindamycin (CLEOCIN) 300 MG capsule Take 1 capsule by mouth 3 (Three) Times a Day for 10 days. 30 capsule 0    HYDROcodone-acetaminophen (NORCO) 5-325 MG per tablet Take 1 tablet by mouth Every 6 (Six) Hours As Needed for Severe Pain. 10 tablet 0    methylPREDNISolone (MEDROL) 4 MG dose pack Take as directed on package instructions. 21 tablet 0     Allergies   Allergen Reactions    Penicillins Anaphylaxis       Objective    Objective     Vital Signs  Temp:  [97 °F (36.1 °C)-98.4 °F (36.9 °C)] 97 °F (36.1 °C)  Heart Rate:  [66-88] 66  Resp:  [14-16] 16  BP: (135-144)/(78-90) 135/79  SpO2:  [98 %-100 %] 99 %  on   ;   Device (Oxygen Therapy): room air  Body mass index is 23.98 kg/m².    Physical Exam  Constitutional:       General: He is not in acute distress.     Appearance: He is not ill-appearing or toxic-appearing.      Comments: Appears uncomfortable with  movement of the right arm   HENT:      Head: Normocephalic and atraumatic.      Mouth/Throat:      Mouth: Mucous membranes are moist.   Eyes:      Extraocular Movements: Extraocular movements intact.      Conjunctiva/sclera: Conjunctivae normal.      Pupils: Pupils are equal, round, and reactive to light.   Cardiovascular:      Rate and Rhythm: Normal rate and regular rhythm.      Pulses: Normal pulses.      Heart sounds: Normal heart sounds.   Pulmonary:      Effort: Pulmonary effort is normal. No respiratory distress.      Breath sounds: Normal breath sounds.   Abdominal:      General: Bowel sounds are normal. There is no distension.      Palpations: Abdomen is soft.      Tenderness: There is no abdominal tenderness.   Musculoskeletal:         General: Swelling and tenderness present. Normal range of motion.      Cervical back: Normal range of motion and neck supple.      Comments: Right arm moderately to severely edematous.  Extremely tender to touch.  Range of motion is intact, obvious pain is visible during any movement.  Fingers are tight but with full sensation and movement.  Radial pulse +2.  Cap refill less than 3 seconds.  Skin is warm, dry but not hot.  Superficial scabbed abrasions to the right upper shoulder, without drainage.   Skin:     General: Skin is warm and dry.   Neurological:      General: No focal deficit present.      Mental Status: He is alert and oriented to person, place, and time.   Psychiatric:         Mood and Affect: Mood normal.       Results Review:  I reviewed the patient's new clinical results.      Lab Results (last 24 hours)       Procedure Component Value Units Date/Time    CBC & Differential [[id number]]  (Abnormal) Collected: 08/26/23 1856    Specimen: Blood Updated: 08/26/23 1906    Narrative:      The following orders were created for panel order CBC & Differential.  Procedure                               Abnormality         Status                     ---------                                -----------         ------                     CBC Auto Differential[[id number]]        Abnormal            Final result                 Please view results for these tests on the individual orders.    Comprehensive Metabolic Panel [[id number]]  (Abnormal) Collected: 08/26/23 1856    Specimen: Blood Updated: 08/26/23 1924     Glucose 88 mg/dL      BUN 21 mg/dL      Creatinine 1.23 mg/dL      Sodium 129 mmol/L      Potassium 4.2 mmol/L      Chloride 100 mmol/L      CO2 27.1 mmol/L      Calcium 8.5 mg/dL      Total Protein 5.6 g/dL      Albumin 3.4 g/dL      ALT (SGPT) 91 U/L      AST (SGOT) 76 U/L      Alkaline Phosphatase 57 U/L      Total Bilirubin 0.3 mg/dL      Globulin 2.2 gm/dL      A/G Ratio 1.5 g/dL      BUN/Creatinine Ratio 17.1     Anion Gap 1.9 mmol/L      eGFR 68.9 mL/min/1.73     Narrative:      GFR Normal >60  Chronic Kidney Disease <60  Kidney Failure <15      CBC Auto Differential [[id number]]  (Abnormal) Collected: 08/26/23 1856    Specimen: Blood Updated: 08/26/23 1906     WBC 8.77 10*3/mm3      RBC 4.01 10*6/mm3      Hemoglobin 12.7 g/dL      Hematocrit 37.2 %      MCV 92.8 fL      MCH 31.7 pg      MCHC 34.1 g/dL      RDW 12.1 %      RDW-SD 42.1 fl      MPV 9.3 fL      Platelets 225 10*3/mm3      Neutrophil % 67.1 %      Lymphocyte % 18.6 %      Monocyte % 8.9 %      Eosinophil % 3.8 %      Basophil % 0.5 %      Immature Grans % 1.1 %      Neutrophils, Absolute 5.89 10*3/mm3      Lymphocytes, Absolute 1.63 10*3/mm3      Monocytes, Absolute 0.78 10*3/mm3      Eosinophils, Absolute 0.33 10*3/mm3      Basophils, Absolute 0.04 10*3/mm3      Immature Grans, Absolute 0.10 10*3/mm3     CK [[id number]]  (Abnormal) Collected: 08/26/23 1856    Specimen: Blood Updated: 08/27/23 0134     Creatine Kinase 1,177 U/L     Myoglobin, Serum [[id number]] Collected: 08/26/23 1856    Specimen: Blood Updated: 08/26/23 0688    Protime-INR, Fingerstick [[id number]]  (Abnormal) Collected: 08/26/23 1906     Specimen: Capillary Blood Updated: 08/26/23 1919     Protime 10.9 Seconds      Comment: Serial Number: [id number]:  [id number]        INR 0.90    Lactic Acid, Plasma [[id number]]  (Normal) Collected: 08/26/23 2100    Specimen: Blood Updated: 08/26/23 2123     Lactate 0.7 mmol/L     Urinalysis With Microscopic - Urine, Clean Catch [[id number]] Collected: 08/27/23 0048    Specimen: Urine, Clean Catch Updated: 08/27/23 0048    Narrative:      The following orders were created for panel order Urinalysis With Microscopic - Urine, Clean Catch.  Procedure                               Abnormality         Status                     ---------                               -----------         ------                     Urinalysis without micro...[[id number]]                      In process                 Urinalysis, Microscopic ...[[id number]]                      In process                   Please view results for these tests on the individual orders.    Urine Drug Screen - Urine, Clean Catch [[id number]]  (Abnormal) Collected: 08/27/23 0048    Specimen: Urine, Clean Catch Updated: 08/27/23 0101     THC, Screen, Urine Positive     Phencyclidine (PCP), Urine Negative     Cocaine Screen, Urine Negative     Methamphetamine, Ur Negative     Opiate Screen Negative     Amphetamine Screen, Urine Negative     Benzodiazepine Screen, Urine Negative     Tricyclic Antidepressants Screen Negative     Methadone Screen, Urine Negative     Barbiturates Screen, Urine Positive     Oxycodone Screen, Urine Negative     Propoxyphene Screen Negative     Buprenorphine, Screen, Urine Negative    Narrative:      Cutoff For Drugs Screened:    Amphetamines               500 ng/ml  Barbiturates               200 ng/ml  Benzodiazepines            150 ng/ml  Cocaine                    150 ng/ml  Methadone                  200 ng/ml  Opiates                    100 ng/ml  Phencyclidine               25 ng/ml  THC                            50  ng/ml  Methamphetamine            500 ng/ml  Tricyclic Antidepressants  300 ng/ml  Oxycodone                  100 ng/ml  Propoxyphene               300 ng/ml  Buprenorphine               10 ng/ml    The normal value for all drugs tested is negative. This report includes unconfirmed screening results, with the cutoff values listed, to be used for medical treatment purposes only.  Unconfirmed results must not be used for non-medical purposes such as employment or legal testing.  Clinical consideration should be applied to any drug of abuse test, particularly when unconfirmed results are used.      Urinalysis With Microscopic If Indicated (No Culture) - Urine, Clean Catch [[id number]]  (Abnormal) Collected: 08/27/23 0048    Specimen: Urine, Clean Catch Updated: 08/27/23 0051     Color, UA Yellow     Appearance, UA Clear     pH, UA 6.0     Specific Gravity, UA 1.010     Glucose, UA Negative     Ketones, UA Negative     Bilirubin, UA Negative     Blood, UA Trace     Protein, UA Negative     Leuk Esterase, UA Negative     Nitrite, UA Negative     Urobilinogen, UA 0.2 E.U./dL    Urinalysis without microscopic (no culture) - Urine, Clean Catch [[id number]] Collected: 08/27/23 0048    Specimen: Urine, Clean Catch Updated: 08/27/23 0048    Urinalysis, Microscopic Only - Urine, Clean Catch [[id number]] Collected: 08/27/23 0048    Specimen: Urine, Clean Catch Updated: 08/27/23 0048            Imaging Results (Last 24 Hours)       Procedure Component Value Units Date/Time    CT Upper Extremity Right With Contrast [[id number]] Collected: 08/26/23 2308     Updated: 08/26/23 2308    Narrative:        Patient: ABIMBOLA MELENDEZ  Time Out: 23:07  Exam(s): CT EXTREMITY RIGHT UPPER With Contrast     EXAM:    CT Right Upper Extremity With Intravenous Contrast    CLINICAL HISTORY:     Reason for exam: arm pain and swelling.    TECHNIQUE:    Axial computed tomography images of the right upper extremity with   intravenous contrast.  CTDI is 7.42  mGy and DLP is 475.5 mGy-cm.  This CT   exam was performed according to the principle of ALARA (As Low As   Reasonably Achievable) by using one or more of the following dose   reduction techniques: automated exposure control, adjustment of the mA   and or kV according to patient size, and or use of iterative   reconstruction technique.    COMPARISON:    No relevant prior studies available.    FINDINGS:    Bones joints:  No acute fracture.  Glenohumeral degenerative changes   present.  There is a loose body within the subscapular recess and   remodeling of the glenoid.  No erosive changes to suggest osteomyelitis.    The shoulder joint itself would be better evaluated by MRI.  No   dislocation.    Soft tissues:  Multifocal areas of geographic absent intramuscular   enhancement around the shoulder most pronounced within the deltoid but   also involving the proximal triceps, latissimus, and pectoralis major.    Extensive subcutaneous soft tissue edema throughout the right upper   extremity.  No visualized abscess or soft tissue gas.  No soft tissue   hematoma identified.    IMPRESSION:       1.  Multifocal areas of geographic absent intramuscular enhancement   around the shoulder most pronounced within the deltoid but also involving   the proximal triceps, latissimus, and pectoralis major.  Muscular infarct,   myositis, and posttraumatic changes are all on the differential.    Consider MRI of the shoulder with and without contrast.  2.  Extensive subcutaneous soft tissue edema throughout the right upper   extremity.  No visualized abscess or soft tissue gas.      Impression:          Electronically signed by Baldomero Rodriguez MD on 08-26-23 at 2305    US Venous Doppler Upper Extremity Right (duplex) [[id number]] Collected: 08/26/23 2213     Updated: 08/26/23 2213    Narrative:        Patient: ABIMBOLA MELENDEZ  Time Out: 22:12  Exam(s): US VENOUS RIGHT UPPER EXTREMITY     EXAM:    US Duplex Right Upper Extremity  Veins    CLINICAL HISTORY:     Reason for exam: r o DVT.    TECHNIQUE:    Real-time duplex ultrasound scan of the right upper extremity veins   integrating B-mode two-dimensional vascular structure, Doppler spectral   analysis, color flow Doppler imaging and compression.    COMPARISON:    No relevant prior studies available.    FINDINGS:    Deep veins:  No DVT in the internal jugular, subclavian, axillary, or   brachial veins.    Superficial veins:  No thrombus in the visualized basilic and cephalic   veins.    Soft tissues:  Diffuse subcutaneous soft tissue edema.    IMPRESSION:       No thrombus in the right upper extremity.      Impression:          Electronically signed by Baldomero Rodriguez MD on 08-26-23 at 2212                No orders to display        Assessment/Plan     Active Hospital Problems    Diagnosis  POA    **Myositis of right upper arm [M60.9]  Yes      Resolved Hospital Problems   No resolved problems to display.     White count is normal with a normal lactate.  CK is elevated at 1,177.  Patient failed treatment for cellulitis with clindamycin outpatient.  CT showing concerns for possible muscular infarct or myositis, subcutaneous tissue edema throughout the right upper extremity but no abscess.  Right upper extremity duplex is negative for DVT.  He has been started on vancomycin and pharmacy will dose.  Orthopedics has been consulted.  He denies substance use, however urine drug screen is positive for barbiturates and THC.    I discussed the patient's findings and my recommendations with patient.    VTE Prophylaxis - SCDs.  Code Status - Full code.       HELLEN Bernard  [facility]  08/27/23  09:30 EDT

## 2023-08-27 NOTE — FSED PROVIDER NOTE
Subjective   History of Present Illness  58-year-old male presents complaining of right arm pain and swelling.  Patient states he had a fall on Saturday 8/19 and injured his arm and shoulder.  He came here to the ER on Monday 8/21 and had x-rays which were negative, he was started on symptom control and some clindamycin for cellulitis.  Since then he has had persistent pain and states he has developed significant swelling since last night.  No fevers or systemic symptoms.  No chest pain or SOA.  Patient denies numbness or weakness.  No history of IVDU.    History provided by:  Patient   used: No      Review of Systems   Constitutional: Negative.  Negative for fever.   Respiratory: Negative.  Negative for shortness of breath.    Cardiovascular: Negative.  Negative for chest pain.   Gastrointestinal: Negative.  Negative for abdominal pain and vomiting.   Musculoskeletal:  Positive for arthralgias and myalgias. Negative for back pain and neck pain.   All other systems reviewed and are negative.    History reviewed. No pertinent past medical history.    Allergies   Allergen Reactions    Penicillins Anaphylaxis       History reviewed. No pertinent surgical history.    History reviewed. No pertinent family history.    Social History     Socioeconomic History    Marital status: Single   Tobacco Use    Smoking status: Never     Passive exposure: Never    Smokeless tobacco: Never   Vaping Use    Vaping Use: Never used   Substance and Sexual Activity    Alcohol use: Never    Drug use: Never    Sexual activity: Defer           Objective   Physical Exam  Vitals and nursing note reviewed.   Constitutional:       General: He is not in acute distress.     Appearance: He is not diaphoretic.   HENT:      Head: Normocephalic and atraumatic.      Right Ear: External ear normal.      Left Ear: External ear normal.      Nose: Nose normal.   Eyes:      Pupils: Pupils are equal, round, and reactive to light.    Cardiovascular:      Rate and Rhythm: Normal rate and regular rhythm.      Pulses: Normal pulses.      Heart sounds: Normal heart sounds.   Pulmonary:      Effort: Pulmonary effort is normal. No respiratory distress.      Breath sounds: Normal breath sounds.   Abdominal:      Palpations: Abdomen is soft.      Tenderness: There is no abdominal tenderness.   Musculoskeletal:         General: Swelling and tenderness present.      Cervical back: Normal range of motion and neck supple.      Comments: RUE: diffuse swelling and ttp worst on deltoid and posterior upper arm, no deformity, motor/sensory intact, 2+ radial/ulnar pulses, intact cap refill   Skin:     General: Skin is warm and dry.      Findings: No rash.      Comments: Abrasion, sloughing R lateral shoulder, no crepitus   Neurological:      General: No focal deficit present.      Mental Status: He is alert and oriented to person, place, and time.      Sensory: No sensory deficit.      Motor: No weakness.   Psychiatric:         Mood and Affect: Mood normal.         Behavior: Behavior normal.       Procedures       CT Upper Extremity Right With Contrast   Final Result         Electronically signed by Baldomero Rodriguez MD on 08-26-23 at 2307      US Venous Doppler Upper Extremity Right (duplex)   Final Result         Electronically signed by Baldomero Rodriguez MD on 08-26-23 at 2212            ED Course                                           Medical Decision Making  Number and Complexity of Problems  Differential Diagnosis: Cellulitis, abscess, DVT    MDM Data  My Laboratory interpretation: Mild hyponatremia, LFTs elevated, CK elevated, CBC unremarkable  Radiology interpretation: see reports  Discussed with: hospitalist    Treatment and Disposition  ED Course: 58-year-old male presenting with symptoms as described.  Work-up appears consistent with RUE myositis, likely infectious although other etiologies not ruled out at this time.  Blood work otherwise largely  unremarkable.  Venous duplex ultrasound negative for DVT.  Exam and imaging not consistent with necrotizing fasciitis at this time.  Patient will need IV antibiotics and likely surgical consultation and MRI in the hospital.  He has been accepted to the hospitalist service.    Shared decision making: hospital admission    Problems Addressed:  Myositis of right upper extremity, unspecified myositis type: complicated acute illness or injury    Amount and/or Complexity of Data Reviewed  Labs: ordered.  Radiology: ordered.    Risk  Prescription drug management.  Decision regarding hospitalization.        Final diagnoses:   Myositis of right upper extremity, unspecified myositis type       ED Disposition  ED Disposition       ED Disposition   Decision to Admit    Condition   --    Comment   Level of Care: Med/Surg [1]   Diagnosis: Myositis of right upper arm [[id number]]   Admitting Physician: PRIETO BRUMFIELD [[id number]]   Attending Physician: PRIETO BRUMFIELD [[id number]]   Certification: I Certify That Inpatient Hospital Services Are Medically Necessary For Greater Than 2 Midnights                 No follow-up provider specified.       Medication List      No changes were made to your prescriptions during this visit.

## 2023-08-27 NOTE — PLAN OF CARE
Goal Outcome Evaluation:  Plan of Care Reviewed With: patient      Progress: no change  Outcome Evaluation: New admit this am to A. VSS. Oriented x 4. RA. R upper extremity warm, red, swollen, and tender to touch. Complaints of R arm pain from prior fall, MD paged for orders.

## 2023-08-27 NOTE — PLAN OF CARE
Goal Outcome Evaluation:  Plan of Care Reviewed With: patient, significant other        Progress: no change  Outcome Evaluation: Pt is alert and oriented x4. Up ad ronaldo. Patient states his pain is 10/10 each time but doesnt appear to be in pain. x1 norco given, x2 morphine given. VSS. Continuous pulse ox. NS @ 75. MRI done today. Ortho and Infectious dx saw pt. Will cont plan of care.

## 2023-08-28 ENCOUNTER — READMISSION MANAGEMENT (OUTPATIENT)
Dept: CALL CENTER | Facility: HOSPITAL | Age: 58
End: 2023-08-28
Payer: MEDICAID

## 2023-08-28 VITALS
TEMPERATURE: 97 F | OXYGEN SATURATION: 98 % | RESPIRATION RATE: 16 BRPM | SYSTOLIC BLOOD PRESSURE: 141 MMHG | WEIGHT: 176.81 LBS | HEART RATE: 68 BPM | DIASTOLIC BLOOD PRESSURE: 86 MMHG | BODY MASS INDEX: 23.95 KG/M2 | HEIGHT: 72 IN

## 2023-08-28 LAB
ALBUMIN SERPL-MCNC: 3.5 G/DL (ref 3.5–5.2)
ALBUMIN/GLOB SERPL: 1.5 G/DL
ALP SERPL-CCNC: 57 U/L (ref 39–117)
ALT SERPL W P-5'-P-CCNC: 60 U/L (ref 1–41)
ANION GAP SERPL CALCULATED.3IONS-SCNC: 7.3 MMOL/L (ref 5–15)
ANION GAP SERPL CALCULATED.3IONS-SCNC: 8.3 MMOL/L (ref 5–15)
AST SERPL-CCNC: 42 U/L (ref 1–40)
BILIRUB SERPL-MCNC: 0.2 MG/DL (ref 0–1.2)
BUN SERPL-MCNC: 11 MG/DL (ref 6–20)
BUN SERPL-MCNC: 13 MG/DL (ref 6–20)
BUN/CREAT SERPL: 12.5 (ref 7–25)
BUN/CREAT SERPL: 13.3 (ref 7–25)
CALCIUM SPEC-SCNC: 8.2 MG/DL (ref 8.6–10.5)
CALCIUM SPEC-SCNC: 8.4 MG/DL (ref 8.6–10.5)
CHLORIDE SERPL-SCNC: 106 MMOL/L (ref 98–107)
CHLORIDE SERPL-SCNC: 106 MMOL/L (ref 98–107)
CO2 SERPL-SCNC: 24.7 MMOL/L (ref 22–29)
CO2 SERPL-SCNC: 24.7 MMOL/L (ref 22–29)
CREAT SERPL-MCNC: 0.83 MG/DL (ref 0.76–1.27)
CREAT SERPL-MCNC: 1.04 MG/DL (ref 0.76–1.27)
DEPRECATED RDW RBC AUTO: 39.5 FL (ref 37–54)
EGFRCR SERPLBLD CKD-EPI 2021: 102.7 ML/MIN/1.73
EGFRCR SERPLBLD CKD-EPI 2021: 84.3 ML/MIN/1.73
ERYTHROCYTE [DISTWIDTH] IN BLOOD BY AUTOMATED COUNT: 11.7 % (ref 12.3–15.4)
GLOBULIN UR ELPH-MCNC: 2.3 GM/DL
GLUCOSE SERPL-MCNC: 104 MG/DL (ref 65–99)
GLUCOSE SERPL-MCNC: 98 MG/DL (ref 65–99)
HCT VFR BLD AUTO: 35.8 % (ref 37.5–51)
HGB BLD-MCNC: 12.3 G/DL (ref 13–17.7)
MCH RBC QN AUTO: 31.5 PG (ref 26.6–33)
MCHC RBC AUTO-ENTMCNC: 34.4 G/DL (ref 31.5–35.7)
MCV RBC AUTO: 91.6 FL (ref 79–97)
PLATELET # BLD AUTO: 191 10*3/MM3 (ref 140–450)
PMV BLD AUTO: 9.2 FL (ref 6–12)
POTASSIUM SERPL-SCNC: 3.8 MMOL/L (ref 3.5–5.2)
POTASSIUM SERPL-SCNC: 4 MMOL/L (ref 3.5–5.2)
PROT SERPL-MCNC: 5.8 G/DL (ref 6–8.5)
RBC # BLD AUTO: 3.91 10*6/MM3 (ref 4.14–5.8)
SODIUM SERPL-SCNC: 138 MMOL/L (ref 136–145)
SODIUM SERPL-SCNC: 139 MMOL/L (ref 136–145)
WBC NRBC COR # BLD: 7.17 10*3/MM3 (ref 3.4–10.8)

## 2023-08-28 PROCEDURE — 25010000002 MORPHINE PER 10 MG: Performed by: NURSE PRACTITIONER

## 2023-08-28 PROCEDURE — 85027 COMPLETE CBC AUTOMATED: CPT | Performed by: NURSE PRACTITIONER

## 2023-08-28 PROCEDURE — 25010000002 VANCOMYCIN 750 MG RECONSTITUTED SOLUTION 1 EACH VIAL: Performed by: NURSE PRACTITIONER

## 2023-08-28 PROCEDURE — 80053 COMPREHEN METABOLIC PANEL: CPT | Performed by: HOSPITALIST

## 2023-08-28 PROCEDURE — 99232 SBSQ HOSP IP/OBS MODERATE 35: CPT | Performed by: INTERNAL MEDICINE

## 2023-08-28 RX ORDER — SENNOSIDES 8.6 MG
650 CAPSULE ORAL EVERY 8 HOURS PRN
Qty: 30 TABLET | Refills: 0 | Status: SHIPPED | OUTPATIENT
Start: 2023-08-28

## 2023-08-28 RX ORDER — LIDOCAINE 50 MG/G
1 PATCH TOPICAL EVERY 24 HOURS
Qty: 30 PATCH | Refills: 0 | Status: SHIPPED | OUTPATIENT
Start: 2023-08-28 | End: 2023-09-27

## 2023-08-28 RX ORDER — CYCLOBENZAPRINE HCL 5 MG
5 TABLET ORAL 3 TIMES DAILY PRN
Qty: 10 TABLET | Refills: 0 | Status: SHIPPED | OUTPATIENT
Start: 2023-08-28

## 2023-08-28 RX ADMIN — VANCOMYCIN HYDROCHLORIDE 750 MG: 750 INJECTION, POWDER, LYOPHILIZED, FOR SOLUTION INTRAVENOUS at 01:36

## 2023-08-28 RX ADMIN — HYDROCODONE BITARTRATE AND ACETAMINOPHEN 1 TABLET: 7.5; 325 TABLET ORAL at 09:25

## 2023-08-28 RX ADMIN — MORPHINE SULFATE 1 MG: 2 INJECTION, SOLUTION INTRAMUSCULAR; INTRAVENOUS at 01:30

## 2023-08-28 RX ADMIN — Medication 10 ML: at 09:26

## 2023-08-28 RX ADMIN — HYDROCODONE BITARTRATE AND ACETAMINOPHEN 1 TABLET: 7.5; 325 TABLET ORAL at 13:52

## 2023-08-28 NOTE — CONSULTS
Orthopaedic Surgery  Consult Note  Dr. ARNIE Hyunh” Robe II  [phone]    HPI:  Patient is a 58 y.o. Not  or  male who presents with right shoulder swelling and pain.  Apparently he had a fall about a week ago down the stairs.  He says he was out for about 6 hours and was laying on that shoulder.  He presented to the hospital at that point where x-rays were taken that were negative.  However, due to swelling he was readmitted for concern of right upper extremity infection.  He was seen by infectious disease and this is not currently felt to be an infection.  His inflammatory markers are only mildly elevated.  His CK is significantly elevated.  He had an MRI done yesterday that demonstrated chronic rotator cuff tearing with no other acute findings suspicious for infection.  He complains of achy pain in the right shoulder worse with activity.    MEDICAL HISTORY  History reviewed. No pertinent past medical history.  History reviewed. No pertinent surgical history.  Prior to Admission medications    Medication Sig Start Date End Date Taking? Authorizing Provider   albuterol sulfate  (90 Base) MCG/ACT inhaler Inhale 2 puffs Every 4 (Four) Hours As Needed for Wheezing. 6/21/23   Herman Berman MD   clindamycin (CLEOCIN) 300 MG capsule Take 1 capsule by mouth 3 (Three) Times a Day for 10 days. 8/21/23 8/31/23  Sterling Price MD   HYDROcodone-acetaminophen (NORCO) 5-325 MG per tablet Take 1 tablet by mouth Every 6 (Six) Hours As Needed for Severe Pain. 8/21/23   Sterling Price MD   methylPREDNISolone (MEDROL) 4 MG dose pack Take as directed on package instructions. 6/21/23   Herman Berman MD     Allergies   Allergen Reactions    Penicillins Anaphylaxis       There is no immunization history on file for this patient.  Social History     Tobacco Use    Smoking status: Never     Passive exposure: Never    Smokeless tobacco: Never   Substance Use Topics    Alcohol use: Never      Social  "History     Substance and Sexual Activity   Drug Use Never       VITALS: /86 (BP Location: Left arm, Patient Position: Sitting)   Pulse 68   Temp 97 °F (36.1 °C) (Oral)   Resp 16   Ht 182.9 cm (72\")   Wt 80.2 kg (176 lb 12.9 oz)   SpO2 98%   BMI 23.98 kg/m²  Body mass index is 23.98 kg/m².    PHYSICAL EXAM:   CONSTITUTIONAL: No acute distress  LUNGS: Equal chest rise, no shortness of air  CARDIOVASCULAR: palpable peripheral pulses  SKIN: no skin lesions in the area examined  LYMPH: no lymphadenopathy in the area examined  EXTREMITY: Right Upper Extremity  Tenderness to Palpation: Tenderness to palpation at the shoulder  Gross Deformity:  He does have moderate swelling to the entire right upper extremity.  He has swelling noted down at the hand.  Pulses:  Brisk Capillary Refill  Sensation: Intact to Radial, Median, Ulnar Nerves and grossly throughout extremity  Motor: 5/5 Radial/Ulnar/Median/AIN/PIN Motor Nerves    RADIOLOGY REVIEW:   XR Chest 2 View    Result Date: 8/21/2023   As described.    This report was finalized on 8/21/2023 8:56 PM by Dr. Sheldon Hernandez M.D.      XR Shoulder 2+ View Right    Result Date: 8/21/2023   As described.    This report was finalized on 8/21/2023 8:56 PM by Dr. Sheldon Hernandez M.D.      XR Humerus Right    Result Date: 8/21/2023   As described.    This report was finalized on 8/21/2023 8:56 PM by Dr. Sheldon Hernandez M.D.      XR Forearm 2 View Right    Result Date: 8/21/2023   As described.    This report was finalized on 8/21/2023 8:56 PM by Dr. Sheldon Hernandez M.D.      MRI Shoulder Right With & Without Contrast    Result Date: 8/27/2023  Electronically signed by David Pettit MD on 08-27-23 at 1737    US Venous Doppler Upper Extremity Right (duplex)    Result Date: 8/26/2023  Electronically signed by Baldomero Rodriguez MD on 08-26-23 at 2212    CT Upper Extremity Right With Contrast    Result Date: 8/26/2023  Electronically signed by Baldomero Rodriguez MD on " "08-26-23 at 2307     LABS:   Results for the past 24 hours:   Recent Results (from the past 24 hour(s))   CK    Collection Time: 08/27/23  9:58 AM    Specimen: Blood   Result Value Ref Range    Creatine Kinase 644 (H) 20 - 200 U/L       IMPRESSION:  Patient is a 58 y.o. Not  or  male with right shoulder pain    PLAN:   Admited to: Bill Morales MD  Disposition: His rotator cuff tearing I do not believe is an acute injury due to the fall.  This is likely chronic.  This seems to be more rhabdomyolysis related to his recent fall.  He is okay for physical therapy, range of motion, and eventually strengthening once pain is controlled.  This will likely take 3 to 6 months to fully resolve.  No need for orthopedic follow-up unless he fails to improve.  Other than physical therapy and rest, I am not sure there is much else that can be done for this patient at this time.  Discharge per primary team    R \"Hilario\" Robe ZAMORA MD  Orthopaedic Surgery  [facility]  [phone] - [place] [facility]  [phone] - [place] Office            "

## 2023-08-28 NOTE — PLAN OF CARE
Goal Outcome Evaluation:    Frequently requested medication for pain. Cooperative and med compliant. Will continue with plan of care.

## 2023-08-28 NOTE — OUTREACH NOTE
Prep Survey      Flowsheet Row Responses   Restoration facility patient discharged from? [place]   Is LACE score < 7 ? Yes   Eligibility [facility]   Date of Admission 08/26/23   Date of Discharge 08/27/23   Discharge Disposition Home or Self Care   Discharge diagnosis Myositis of right upper arm   Does the patient have one of the following disease processes/diagnoses(primary or secondary)? Other   Does the patient have Home health ordered? No   Is there a DME ordered? No   Prep survey completed? Yes            Sarah CLANCY - Registered Nurse

## 2023-08-28 NOTE — NURSING NOTE
Patient received discharge orders. All lines removed. All questions answered and discharge education provided. Patient discharged.

## 2023-08-28 NOTE — DISCHARGE SUMMARY
[facility]    ASSOCIATES  [phone]    DISCHARGE SUMMARY  [facility]    Patient Identification:  Name: Jaime Goff  Age: 58 y.o.  Sex: male  :  1965  MRN: [id number]  Primary Care Physician: Tami Peralta APRN    Admit date: 2023  Discharge date and time:      Discharge Diagnoses:  Myositis of right upper arm       History of present illness from H&P:    Mr. Goff is a 58 y.o. male with a history of no known medical history that presents to [facility] complaining of swelling and pain of the right arm.  He had a fall 8 days ago 8 days ago he had a fall and hit his right shoulder on a wall.  He sustained an abrasion to the right shoulder.  He began to have swelling and discomfort and sought medical care on , he was started on clindamycin at that time for cellulitis.  His swelling and pain became progressively worse and he sought care in the ED.  He denies any fevers, chills, sweats, nausea or any symptoms of being unwell.  He did have a couple of episodes of diarrhea.  He noticed some clear drainage from the right shoulder abrasion.    Hospital Course:     Patient was admitted to the hospital with myositis of his right arm.  Myositis was from fall downstairs onto his arm.  Patient was probably on his arm for about 8 to 9 hours.  And this is the presumed cause for the myositis.  There is concern for the possibility of cellulitis.  He was started on clindamycin outpatient.  Patient was admitted to hospital because the swelling was continuing to worsen.  His CK levels were elevated on admission.  He has been given fluids overnight CK levels are trending down.  Kidney function remains stable.  Liver enzymes slightly elevated, we will repeat these prior to discharge.    The patient is remained afebrile White blood cell count is normal.  Procalcitonin levels normal.  He was seen in consultation by orthopedics and infectious disease.  Infectious  disease agrees not infectious in nature.  Orthopedics ordered an MRI of the arm it does show a rotator cuff injury thought to be chronic in nature and not the source for the swelling and myositis.         consults:   Consults       Date and Time Order Name Status Description    8/27/2023  9:37 AM Inpatient Infectious Diseases Consult Completed     8/27/2023  6:18 AM Inpatient Orthopedic Surgery Consult Completed             Results from last 7 days   Lab Units 08/28/23  0730   WBC 10*3/mm3 7.17   HEMOGLOBIN g/dL 12.3*   HEMATOCRIT % 35.8*   PLATELETS 10*3/mm3 191       Results from last 7 days   Lab Units 08/28/23  0730   SODIUM mmol/L 138   POTASSIUM mmol/L 3.8   CHLORIDE mmol/L 106   CO2 mmol/L 24.7   BUN mg/dL 11   CREATININE mg/dL 0.83   GLUCOSE mg/dL 104*   CALCIUM mg/dL 8.4*       Significant Diagnostic Studies:   WBC   Date Value Ref Range Status   08/28/2023 7.17 3.40 - 10.80 10*3/mm3 Final     Hemoglobin   Date Value Ref Range Status   08/28/2023 12.3 (L) 13.0 - 17.7 g/dL Final     Hematocrit   Date Value Ref Range Status   08/28/2023 35.8 (L) 37.5 - 51.0 % Final     Platelets   Date Value Ref Range Status   08/28/2023 191 140 - 450 10*3/mm3 Final     Sodium   Date Value Ref Range Status   08/28/2023 138 136 - 145 mmol/L Final     Potassium   Date Value Ref Range Status   08/28/2023 3.8 3.5 - 5.2 mmol/L Final     Chloride   Date Value Ref Range Status   08/28/2023 106 98 - 107 mmol/L Final     CO2   Date Value Ref Range Status   08/28/2023 24.7 22.0 - 29.0 mmol/L Final     BUN   Date Value Ref Range Status   08/28/2023 11 6 - 20 mg/dL Final     Creatinine   Date Value Ref Range Status   08/28/2023 0.83 0.76 - 1.27 mg/dL Final     Glucose   Date Value Ref Range Status   08/28/2023 104 (H) 65 - 99 mg/dL Final     Calcium   Date Value Ref Range Status   08/28/2023 8.4 (L) 8.6 - 10.5 mg/dL Final     AST (SGOT)   Date Value Ref Range Status   08/26/2023 76 (H) 1 - 40 U/L Final     ALT (SGPT)   Date Value Ref  Range Status   08/26/2023 91 (H) 1 - 41 U/L Final     Alkaline Phosphatase   Date Value Ref Range Status   08/26/2023 57 39 - 117 U/L Final     INR   Date Value Ref Range Status   08/26/2023 0.90  Final     Color, UA   Date Value Ref Range Status   08/27/2023 Yellow Yellow, Straw Final     Appearance, UA   Date Value Ref Range Status   08/27/2023 Clear Clear Final     pH, UA   Date Value Ref Range Status   08/27/2023 6.0 5.0 - 8.0 Final     Glucose, UA   Date Value Ref Range Status   08/27/2023 Negative Negative Final     Ketones, UA   Date Value Ref Range Status   08/27/2023 Negative Negative Final     Blood, UA   Date Value Ref Range Status   08/27/2023 Trace (A) Negative Final     Leuk Esterase, UA   Date Value Ref Range Status   08/27/2023 Negative Negative Final     Bilirubin, UA   Date Value Ref Range Status   08/27/2023 Negative Negative Final     Urobilinogen, UA   Date Value Ref Range Status   08/27/2023 0.2 E.U./dL 0.2 - 1.0 E.U./dL Final     No results found for: TROPONINT, TROPONINI, BNP  No components found for: HGBA1C;2  No components found for: TSH;2    Imaging Results (All)       Procedure Component Value Units Date/Time    MRI Shoulder Right With & Without Contrast [[id number]] Collected: 08/27/23 1737     Updated: 08/27/23 1737    Narrative:        Patient: ABIMBOLA MELENDEZ  Time Out: 17:37  Exam(s): MRI RIGHT SHOULDER W WO Contrast IV Amt: multihance 16ml    EXAM:    MR Right Upper Extremity Without and With Intravenous Contrast,   Shoulder    CLINICAL HISTORY:     Reason for exam: Shoulder pain, no prior imaging.    TECHNIQUE:    Multiplanar magnetic resonance images of the right shoulder without and   with intravenous contrast.    COMPARISON:    No relevant prior studies available.    FINDINGS:   TENDONS:    Supraspinatus: Intact.    Infraspinatus: Intact.    Subscapularis:  Increased T2 signal in the musculotendinous junction of   the subscapularis tendon consistent with musculotendinous junction  tears.    Teres minor:  Partial-thickness tear of the distal teres minor tendon.    Biceps brachii, long head:  Unremarkable.     LIGAMENTS:    Glenohumeral:  Unremarkable.      Muscles:  Edema throughout the dorsal deltoid muscle.    Fluid:  Unremarkable.  No joint effusion.    Cartilage:  Unremarkable.    Glenoid labrum: Degeneration.    Bones joints:  Severe glenohumeral arthritis and cephalad migration of   the humerus.  Moderate acromioclavicular arthritis.    IMPRESSION:       1.  Musculotendinous junction tears of the subscapularis.  2.  Edema throughout the dorsal deltoid muscle.  3.  Partial-thickness teres minor tendon tear.  4.  Severe glenohumeral arthritis and cephalad migration of the humerus.      Impression:          Electronically signed by David Pettit MD on 08-27-23 at 1737    CT Upper Extremity Right With Contrast [[id number]] Collected: 08/26/23 2308     Updated: 08/26/23 2308    Narrative:        Patient: ABIMBOLA MELENDEZ  Time Out: 23:07  Exam(s): CT EXTREMITY RIGHT UPPER With Contrast     EXAM:    CT Right Upper Extremity With Intravenous Contrast    CLINICAL HISTORY:     Reason for exam: arm pain and swelling.    TECHNIQUE:    Axial computed tomography images of the right upper extremity with   intravenous contrast.  CTDI is 7.42 mGy and DLP is 475.5 mGy-cm.  This CT   exam was performed according to the principle of ALARA (As Low As   Reasonably Achievable) by using one or more of the following dose   reduction techniques: automated exposure control, adjustment of the mA   and or kV according to patient size, and or use of iterative   reconstruction technique.    COMPARISON:    No relevant prior studies available.    FINDINGS:    Bones joints:  No acute fracture.  Glenohumeral degenerative changes   present.  There is a loose body within the subscapular recess and   remodeling of the glenoid.  No erosive changes to suggest osteomyelitis.    The shoulder joint itself would be better evaluated  by MRI.  No   dislocation.    Soft tissues:  Multifocal areas of geographic absent intramuscular   enhancement around the shoulder most pronounced within the deltoid but   also involving the proximal triceps, latissimus, and pectoralis major.    Extensive subcutaneous soft tissue edema throughout the right upper   extremity.  No visualized abscess or soft tissue gas.  No soft tissue   hematoma identified.    IMPRESSION:       1.  Multifocal areas of geographic absent intramuscular enhancement   around the shoulder most pronounced within the deltoid but also involving   the proximal triceps, latissimus, and pectoralis major.  Muscular infarct,   myositis, and posttraumatic changes are all on the differential.    Consider MRI of the shoulder with and without contrast.  2.  Extensive subcutaneous soft tissue edema throughout the right upper   extremity.  No visualized abscess or soft tissue gas.      Impression:          Electronically signed by Baldomero Rodriguez MD on 08-26-23 at 2307    US Venous Doppler Upper Extremity Right (duplex) [[id number]] Collected: 08/26/23 2213     Updated: 08/26/23 2213    Narrative:        Patient: ABIMBOLA MELENDEZ  Time Out: 22:12  Exam(s): US VENOUS RIGHT UPPER EXTREMITY     EXAM:    US Duplex Right Upper Extremity Veins    CLINICAL HISTORY:     Reason for exam: r o DVT.    TECHNIQUE:    Real-time duplex ultrasound scan of the right upper extremity veins   integrating B-mode two-dimensional vascular structure, Doppler spectral   analysis, color flow Doppler imaging and compression.    COMPARISON:    No relevant prior studies available.    FINDINGS:    Deep veins:  No DVT in the internal jugular, subclavian, axillary, or   brachial veins.    Superficial veins:  No thrombus in the visualized basilic and cephalic   veins.    Soft tissues:  Diffuse subcutaneous soft tissue edema.    IMPRESSION:       No thrombus in the right upper extremity.      Impression:          Electronically signed by  Baldomero Rodriguez MD on 08-26-23 at 2212        No results found for: SITE, ALLENTEST, PHART, DRA3PHO, PO2ART, TQG9YXT, BASEEXCESS, Z1FBIVOF, HGBBG, HCTABG, OXYHEMOGLOBI, METHHGBN, CARBOXYHGB, CO2CT, BAROMETRIC, MODALITY, FIO2       Discharge Medications        New Medications        Instructions Start Date   acetaminophen 650 MG 8 hr tablet  Commonly known as: Tylenol 8 Hour   650 mg, Oral, Every 8 Hours PRN      cyclobenzaprine 5 MG tablet  Commonly known as: FLEXERIL   5 mg, Oral, 3 Times Daily PRN      lidocaine 5 %  Commonly known as: Lidoderm   1 patch, Transdermal, Every 24 Hours, Remove & Discard patch within 12 hours or as directed by MD             Stop These Medications      albuterol sulfate  (90 Base) MCG/ACT inhaler  Commonly known as: PROVENTIL HFA;VENTOLIN HFA;PROAIR HFA     clindamycin 300 MG capsule  Commonly known as: CLEOCIN     HYDROcodone-acetaminophen 5-325 MG per tablet  Commonly known as: NORCO     methylPREDNISolone 4 MG dose pack  Commonly known as: MEDROL                Patient Instructions:       No future appointments.      Follow-up Information       Tami Peralta APRN .    Specialties: Family Medicine, Nurse Practitioner  Contact information:  [address] [place], [place] [address] [phone]                             Discharge Order (From admission, onward)       Start     Ordered    08/28/23 1423  Discharge patient  Once        Expected Discharge Date: 08/28/23   Discharge Disposition: Home or Self Care   Physician of Record for Attribution - Please select from Treatment Team: JOSEPH CHAMBERS [[id number]]   Review needed by CMO to determine Physician of Record: No      Question Answer Comment   Physician of Record for Attribution - Please select from Treatment Team JOSEPH CHAMBERS    Review needed by CMO to determine Physician of Record No        08/28/23 1430                    Diet Order   Procedures    Diet: Regular/House Diet; Texture: Regular Texture (IDDSI 7);  Fluid Consistency: Thin (IDDSI 0)       TEST  RESULTS PENDING AT DISCHARGE  Pending Labs       Order Current Status    Blood Culture - Blood, Arm, Left Preliminary result    Blood Culture - Blood, Arm, Left Preliminary result              Discharge instructions:  Follow up with your primary care provider in 1-2 weeks with a cbc and cmp         Total time spent discharging patient including evaluation, post hospitalization follow up,  medication and post hospitalization instructions and education, total time exceeds 30 minutes.    Signed:  Bill Morales MD  8/28/2023  14:32 EDT

## 2023-08-28 NOTE — PROGRESS NOTES
ID NOTE    CC: f/u right arm swelling    Subj:  No fever. He says the arm is a little better. He is eager for DC today.     Medications:    Current Facility-Administered Medications:     acetaminophen (TYLENOL) tablet 650 mg, 650 mg, Oral, Q4H PRN **OR** acetaminophen (TYLENOL) 160 MG/5ML solution 650 mg, 650 mg, Oral, Q4H PRN **OR** acetaminophen (TYLENOL) suppository 650 mg, 650 mg, Rectal, Q4H PRN, Edwige Hunter APRN    sennosides-docusate (PERICOLACE) 8.6-50 MG per tablet 2 tablet, 2 tablet, Oral, BID, 2 tablet at 08/27/23 2025 **AND** polyethylene glycol (MIRALAX) packet 17 g, 17 g, Oral, Daily PRN **AND** bisacodyl (DULCOLAX) EC tablet 5 mg, 5 mg, Oral, Daily PRN **AND** bisacodyl (DULCOLAX) suppository 10 mg, 10 mg, Rectal, Daily PRN, Edwige Hunter APRN    calcium carbonate (TUMS) chewable tablet 500 mg (200 mg elemental), 2 tablet, Oral, BID PRN, Edwige Hunter APRN    HYDROcodone-acetaminophen (NORCO) 7.5-325 MG per tablet 1 tablet, 1 tablet, Oral, Q4H PRN, Edwige Hunter APRN, 1 tablet at 08/28/23 0925    morphine injection 1 mg, 1 mg, Intravenous, Q4H PRN, Edwige Hunter APRN, 1 mg at 08/28/23 0130    ondansetron (ZOFRAN) tablet 4 mg, 4 mg, Oral, Q6H PRN **OR** ondansetron (ZOFRAN) injection 4 mg, 4 mg, Intravenous, Q6H PRN, Edwige Hunter APRN    Pharmacy to dose vancomycin, , Does not apply, Continuous PRN, Edwige Hunter APRN    sodium chloride 0.9 % flush 10 mL, 10 mL, Intravenous, Q12H, Edwige Hunter APRN, 10 mL at 08/28/23 0926    sodium chloride 0.9 % flush 10 mL, 10 mL, Intravenous, PRN, Edwige Hunter APRN    sodium chloride 0.9 % infusion 40 mL, 40 mL, Intravenous, PRN, Edwige Hunter APRN    sodium chloride 0.9 % infusion, 75 mL/hr, Intravenous, Continuous, Edwige Hunter APRN, Stopped at 08/27/23 2351    vancomycin 750 mg in sodium chloride 0.9 % 250 mL IVPB-VTB, 750 mg, Intravenous, Q12H, Edwige Hunter  "APRN, Last Rate: 333.3 mL/hr at 08/28/23 0136, 750 mg at 08/28/23 0136      Objective   Vital Signs   Temp:  [97 °F (36.1 °C)-97.3 °F (36.3 °C)] 97 °F (36.1 °C)  Heart Rate:  [59-68] 68  Resp:  [16] 16  BP: (141-155)/(78-86) 141/86    Physical Exam:   General: awake, alert, NAD, very nice  Eyes: no scleral icterus  Cardiovascular: NR  Respiratory: normal work of breathing on RA  :  no Hi catheter  MSK: R shoulder abrasion; mildly warm but no erythema or drainage; R arm edematous; good movement of fingers  Neurological: Alert and oriented x 3  Psychiatric: Normal mood and affect   Vasc: PIV w/o erythema; R radial pulse is strong    Labs:   CBC, BMP, and blood cultures reviewed today  Lab Results   Component Value Date    WBC 7.17 08/28/2023    HGB 12.3 (L) 08/28/2023    HCT 35.8 (L) 08/28/2023    MCV 91.6 08/28/2023     08/28/2023       Lab Results   Component Value Date    GLUCOSE 104 (H) 08/28/2023    BUN 11 08/28/2023    CREATININE 0.83 08/28/2023    BCR 13.3 08/28/2023    CO2 24.7 08/28/2023    CALCIUM 8.4 (L) 08/28/2023    ALBUMIN 3.4 (L) 08/26/2023    AST 76 (H) 08/26/2023    ALT 91 (H) 08/26/2023     Lab Results   Component Value Date    CRP 0.39 08/26/2023     Lab Results   Component Value Date    CKTOTAL 644 (H) 08/27/2023     Procal 0.11  LA 0.7  UDS + THC and barbiturates    Microbiology:  8/26 BCx: NGTD    New Radiology:  MRI R shoulder:   \"1.  Musculotendinous junction tears of the subscapularis.   2.  Edema throughout the dorsal deltoid muscle.   3.  Partial-thickness teres minor tendon tear.   4.  Severe glenohumeral arthritis and cephalad migration of the humerus. \"    Prior radiology:  CT RUE:   \"1.  Multifocal areas of geographic absent intramuscular enhancement   around the shoulder most pronounced within the deltoid but also involving   the proximal triceps, latissimus, and pectoralis major.  Muscular infarct,    myositis, and posttraumatic changes are all on the differential.  "   Consider MRI of the shoulder with and without contrast.   2.  Extensive subcutaneous soft tissue edema throughout the right upper   extremity.  No visualized abscess or soft tissue gas.     ASSESSMENT/PLAN:  Right arm pain and swelling  Rotator cuff tear  Myositis due to trauma s/p fall down stairs at home  Elevated CPK      He is afebrile with a normal WBC and negative blood cultures. I do not see any evidence of infection on his R arm. We have an alternative diagnosis based on his fall and MRI findings. Orthopedics note reviewed. Stop IV vancomycin. Infectious diseases will sign off at this time.    D/w Dr Morales.

## 2023-08-28 NOTE — PLAN OF CARE
Goal Outcome Evaluation:           Problem: Adult Inpatient Plan of Care  Goal: Plan of Care Review  Outcome: Met  Goal: Patient-Specific Goal (Individualized)  Outcome: Met  Goal: Absence of Hospital-Acquired Illness or Injury  Outcome: Met  Intervention: Identify and Manage Fall Risk  Recent Flowsheet Documentation  Taken 8/28/2023 1400 by Anna Jasmine RN  Safety Promotion/Fall Prevention:   assistive device/personal items within reach   clutter free environment maintained   fall prevention program maintained   lighting adjusted   nonskid shoes/slippers when out of bed   room organization consistent   safety round/check completed  Taken 8/28/2023 1200 by Anna Jasmine RN  Safety Promotion/Fall Prevention:   assistive device/personal items within reach   clutter free environment maintained   fall prevention program maintained   lighting adjusted   nonskid shoes/slippers when out of bed   room organization consistent   safety round/check completed  Taken 8/28/2023 1000 by Anna Jasmine RN  Safety Promotion/Fall Prevention:   assistive device/personal items within reach   clutter free environment maintained   fall prevention program maintained   lighting adjusted   nonskid shoes/slippers when out of bed   room organization consistent   safety round/check completed  Taken 8/28/2023 0800 by Anna Jasmine RN  Safety Promotion/Fall Prevention:   assistive device/personal items within reach   clutter free environment maintained   fall prevention program maintained   lighting adjusted   nonskid shoes/slippers when out of bed   room organization consistent   safety round/check completed  Intervention: Prevent Skin Injury  Recent Flowsheet Documentation  Taken 8/28/2023 1400 by Anna Jasmine RN  Body Position:   position changed independently   30 degrees  Taken 8/28/2023 1200 by Anna Jasmine RN  Body Position:   position changed independently   30 degrees  Taken 8/28/2023 1000 by Anna Jasmine  RN  Body Position: position changed independently  Taken 8/28/2023 0800 by Anna Jasmine RN  Body Position: position changed independently  Intervention: Prevent and Manage VTE (Venous Thromboembolism) Risk  Recent Flowsheet Documentation  Taken 8/28/2023 0800 by Anna Jasmine RN  VTE Prevention/Management: patient refused intervention  Intervention: Prevent Infection  Recent Flowsheet Documentation  Taken 8/28/2023 0800 by Anna Jasmine RN  Infection Prevention:   environmental surveillance performed   equipment surfaces disinfected   hand hygiene promoted   rest/sleep promoted   single patient room provided  Goal: Optimal Comfort and Wellbeing  Outcome: Met  Intervention: Provide Person-Centered Care  Recent Flowsheet Documentation  Taken 8/28/2023 0800 by Anna Jasmine RN  Trust Relationship/Rapport:   care explained   choices provided   emotional support provided   empathic listening provided   questions answered   questions encouraged   reassurance provided   thoughts/feelings acknowledged  Goal: Readiness for Transition of Care  Outcome: Met

## 2023-08-29 ENCOUNTER — TRANSITIONAL CARE MANAGEMENT TELEPHONE ENCOUNTER (OUTPATIENT)
Dept: CALL CENTER | Facility: HOSPITAL | Age: 58
End: 2023-08-29
Payer: MEDICAID

## 2023-08-29 NOTE — PROGRESS NOTES
Case Management Discharge Note      Final Note: Discharged to home on 8/28/23. ARAM Guidry RN ,CCP.         Selected Continued Care - Discharged on 8/28/2023 Admission date: 8/26/2023 - Discharge disposition: Home or Self Care      Destination    No services have been selected for the patient.                Durable Medical Equipment    No services have been selected for the patient.                Dialysis/Infusion    No services have been selected for the patient.                Home Medical Care    No services have been selected for the patient.                Therapy    No services have been selected for the patient.                Community Resources    No services have been selected for the patient.                Community & DME    No services have been selected for the patient.                    Transportation Services  Private: Car    Final Discharge Disposition Code: 01 - home or self-care

## 2023-08-29 NOTE — OUTREACH NOTE
Call Center TCM Note      Flowsheet Row Responses   [facility] patient discharged from? [place]   Does the patient have one of the following disease processes/diagnoses(primary or secondary)? Other   TCM attempt successful? No   Unsuccessful attempts Attempt 2   Call Status Left message            Cyn Martínez RN    8/29/2023, 15:00 EDT

## 2023-08-29 NOTE — OUTREACH NOTE
Call Center TCM Note      Flowsheet Row Responses   [facility] patient discharged from? [place]   Does the patient have one of the following disease processes/diagnoses(primary or secondary)? Other   TCM attempt successful? No   Unsuccessful attempts Attempt 1  [no  verbal release or other contacts listed.]            Cyn Martínez RN    8/29/2023, 09:38 EDT

## 2023-08-30 ENCOUNTER — TRANSITIONAL CARE MANAGEMENT TELEPHONE ENCOUNTER (OUTPATIENT)
Dept: CALL CENTER | Facility: HOSPITAL | Age: 58
End: 2023-08-30
Payer: MEDICAID

## 2023-08-30 NOTE — OUTREACH NOTE
Call Center TCM Note      Flowsheet Row Responses   [facility] patient discharged from? [place]   Does the patient have one of the following disease processes/diagnoses(primary or secondary)? Other   TCM attempt successful? Yes   Call start time 1144   Call end time 1149   Discharge diagnosis Myositis of right upper arm   Meds reviewed with patient/caregiver? Yes   Is the patient having any side effects they believe may be caused by any medication additions or changes? No   Does the patient have all medications ordered at discharge? Yes   Is the patient taking all medications as directed (includes completed medication regime)? Yes   Comments Pt states he needs appt today due to going back to work.  Appt made for today at 3:30   Does the patient have an appointment with their PCP within 7-14 days of discharge? No   Nursing Interventions Assisted patient with making appointment per protocol   Psychosocial issues? No   Did the patient receive a copy of their discharge instructions? Yes   Nursing interventions Reviewed instructions with patient   What is the patient's perception of their health status since discharge? Improving   Is the patient/caregiver able to teach back signs and symptoms related to disease process for when to call PCP? Yes   Is the patient/caregiver able to teach back signs and symptoms related to disease process for when to call 911? Yes   Is the patient/caregiver able to teach back the hierarchy of who to call/visit for symptoms/problems? PCP, Specialist, Home health nurse, Urgent Care, ED, 911 Yes   If the patient is a current smoker, are they able to teach back resources for cessation? Not a smoker   TCM call completed? Yes   Wrap up additional comments appt made with PCP.  No other needs at this time.   Call end time 1149            Kyung Bunn LPN    8/30/2023, 11:49 EDT

## 2023-09-01 LAB
BACTERIA SPEC AEROBE CULT: NORMAL
BACTERIA SPEC AEROBE CULT: NORMAL

## 2024-11-14 ENCOUNTER — APPOINTMENT (OUTPATIENT)
Dept: GENERAL RADIOLOGY | Facility: HOSPITAL | Age: 59
End: 2024-11-14
Payer: COMMERCIAL

## 2024-11-14 ENCOUNTER — HOSPITAL ENCOUNTER (EMERGENCY)
Facility: HOSPITAL | Age: 59
Discharge: HOME | End: 2024-11-14
Attending: EMERGENCY MEDICINE
Payer: COMMERCIAL

## 2024-11-14 VITALS
TEMPERATURE: 97.5 F | HEART RATE: 64 BPM | OXYGEN SATURATION: 98 % | HEIGHT: 72 IN | DIASTOLIC BLOOD PRESSURE: 98 MMHG | SYSTOLIC BLOOD PRESSURE: 167 MMHG | RESPIRATION RATE: 16 BRPM | WEIGHT: 175 LBS | BODY MASS INDEX: 23.7 KG/M2

## 2024-11-14 DIAGNOSIS — S93.402A SPRAIN OF LEFT ANKLE, UNSPECIFIED LIGAMENT, INITIAL ENCOUNTER: Primary | ICD-10-CM

## 2024-11-14 PROCEDURE — 73610 X-RAY EXAM OF ANKLE: CPT

## 2024-11-14 PROCEDURE — 99283 EMERGENCY DEPT VISIT LOW MDM: CPT

## 2024-11-14 PROCEDURE — 99283 EMERGENCY DEPT VISIT LOW MDM: CPT | Performed by: EMERGENCY MEDICINE

## 2024-11-14 NOTE — Clinical Note
[facility] [place]  [address]  [place] [address]    Jaime Goff was seen and treated in our emergency department on 11/14/2024.  He may return to work on 11/16/2024.         Thank you for choosing [facility].    Leonel Muir MD

## 2024-11-14 NOTE — FSED PROVIDER NOTE
"    [facility]  eMERGENCY dEPARTMENT eNCOUnter      Pt Name: Jaime Goff  MRN: [id number]  Birthdate 1965  Date of evaluation: 11/14/2024  Provider: Leonel Muir MD    CHIEF COMPLAINT       Chief Complaint   Patient presents with    Ankle Injury       Nurses Notes reviewed and I agree except as noted in the HPI.      HISTORY OF PRESENT ILLNESS       History provided by:  Patient        Jaime Goff is a 59 y.o. male who presents to the emergency department for left ankle pain.  He tells me that he rolled his ankle, down some stairs today.  Patient was able to ambulate after the injury.  He has not take anything for pain.      REVIEW OF SYSTEMS       Review of Systems   Musculoskeletal:  Positive for arthralgias (Left ankle pain).         PAST MEDICAL HISTORY     History reviewed. No pertinent past medical history.      SURGICAL HISTORY        has no past surgical history on file.      CURRENT MEDICATIONS          Medication List        CONTINUE taking these medications      acetaminophen 650 MG 8 hr tablet  Commonly known as: Tylenol 8 Hour  Take 1 tablet by mouth Every 8 (Eight) Hours As Needed for Mild Pain.     cyclobenzaprine 5 MG tablet  Commonly known as: FLEXERIL  Take 1 tablet by mouth 3 (Three) Times a Day As Needed for Muscle Spasms.                ALLERGIES       is allergic to penicillins.      FAMILY HISTORY       has no family status information on file.    family history is not on file.    SOCIAL HISTORY        reports that he has never smoked. He has never been exposed to tobacco smoke. He has never used smokeless tobacco. He reports that he does not drink alcohol and does not use drugs.    PHYSICAL EXAM       INITIAL VITALS:  height is 182.9 cm (72\") and weight is 79.4 kg (175 lb). His oral temperature is 97.5 °F (36.4 °C). His blood pressure is 167/98 and his pulse is 64. His respiration is 16 and oxygen saturation is 98%.      Physical Exam  Vitals and nursing " note reviewed.   Constitutional:       Appearance: Normal appearance. He is not ill-appearing.   HENT:      Head: Normocephalic and atraumatic.   Eyes:      Extraocular Movements: Extraocular movements intact.      Pupils: Pupils are equal, round, and reactive to light.   Musculoskeletal:         General: Swelling (Mild), tenderness and signs of injury present. Normal range of motion.      Left ankle: Swelling (Mild) present. Tenderness present. Normal range of motion.        Feet:    Skin:     General: Skin is warm and dry.   Neurological:      General: No focal deficit present.      Mental Status: He is alert and oriented to person, place, and time.           DIAGNOSTIC RESULTS     EKG: All EKG's are interpreted by the Emergency Department Physician who either signs or Co-signs this chart in the absence of a cardiologist.        RADIOLOGY: non-plain film images(s) such as CT, Ultrasound and MRI are read by the radiologist.  Plain radiographic images are visualized and preliminarily interpreted by the emergency physician unless otherwise stated below.      XR Ankle 3+ View Left   Final Result      FINDINGS: There is subtle widening of the lateral left ankle joint.  There is a small spur arising from the medial malleolus. Adjacent to the  tip of the medial malleolus as well as the lateral malleolus are small  calcific densities. These appear fairly well-corticated and may simply  represent small ossicles or old avulsion fragments, an acute avulsion  cannot be excluded, no prior imaging of the ankle for comparison.     No osteochondral defect. There is bone hypertrophy about the anterior  ankle joint. There is soft tissue calcification along the pre-Achilles  fat pad and a sizable os trigonum. There is hindfoot joint degeneration  also noted. The remainder is unremarkable.         LABS:   Lab Results (last 24 hours)       ** No results found for the last 24 hours. **            EMERGENCY DEPARTMENT COURSE:   Vitals:   "  Vitals:    11/14/24 0555   BP: 167/98   BP Location: Right arm   Patient Position: Sitting   Pulse: 64   Resp: 16   Temp: 97.5 °F (36.4 °C)   TempSrc: Oral   SpO2: 98%   Weight: 79.4 kg (175 lb)   Height: 182.9 cm (72\")       Medical Decision Making  Patient presented to the ED after rolling his ankle coming down some stairs this morning.  Imaging was negative for any acute fracture.  Patient requested a few days off.  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  After careful review of history, physical and supporting data, there is low suspicion for a life or limb threatening cause of patient's symptoms.  The patient's symptoms have improved from presentation and they appear clinically well.  Patient reexamined prior to discharge and they appear improved.  Patient has been encouraged to follow-up with his/her PCP and to return to the ED with any lack of improvement or worsening symptoms.  The patient's questions regarding the work-up and plan were invited and answered.  The patient states understanding and agreement with discharge planning.      Problems Addressed:  Sprain of left ankle, unspecified ligament, initial encounter: complicated acute illness or injury    Amount and/or Complexity of Data Reviewed  Radiology: ordered and independent interpretation performed.         The patient was given the following medications:  Medications - No data to display         CRITICAL CARE:  none    CONSULTS:  none    PROCEDURES:  Procedures  None    DIFFERENTIAL DIAGNOSIS:     Differential diagnoses include, but not limited to ankle sprain, carey fracture, Tri mal fracture, ankle dislocation      FINAL IMPRESSION      1. Sprain of left ankle, unspecified ligament, initial encounter          DISPOSITION/PLAN     PATIENT REFERRED TO:  James Nagel II, MD  [address] [place] [address]  [phone]    Call in 3 days  As needed      DISCHARGE MEDICATIONS:     Medication " List        CONTINUE taking these medications      acetaminophen 650 MG 8 hr tablet  Commonly known as: Tylenol 8 Hour  Take 1 tablet by mouth Every 8 (Eight) Hours As Needed for Mild Pain.     cyclobenzaprine 5 MG tablet  Commonly known as: FLEXERIL  Take 1 tablet by mouth 3 (Three) Times a Day As Needed for Muscle Spasms.              (Please note that portions of this note were completed with a voice recognition program.  Efforts were made to edit the dictations but occasionally words are mis-transcribed.)    Leonel Muir MD (electronically signed) Emergency Physician

## 2024-11-14 NOTE — Clinical Note
[facility] [place]  [address]  [place] [address]    Jaime Goff was seen and treated in our emergency department on 11/14/2024.  He may return to work on 11/15/2024.         Thank you for choosing [facility].    Leonel Muir MD